# Patient Record
Sex: MALE | Race: WHITE | NOT HISPANIC OR LATINO | Employment: FULL TIME | URBAN - METROPOLITAN AREA
[De-identification: names, ages, dates, MRNs, and addresses within clinical notes are randomized per-mention and may not be internally consistent; named-entity substitution may affect disease eponyms.]

---

## 2017-03-23 ENCOUNTER — HOSPITAL ENCOUNTER (EMERGENCY)
Facility: HOSPITAL | Age: 27
Discharge: HOME | End: 2017-03-23
Attending: EMERGENCY MEDICINE
Payer: MEDICAID

## 2017-03-23 VITALS
TEMPERATURE: 100 F | DIASTOLIC BLOOD PRESSURE: 59 MMHG | RESPIRATION RATE: 14 BRPM | OXYGEN SATURATION: 100 % | SYSTOLIC BLOOD PRESSURE: 123 MMHG | HEART RATE: 95 BPM

## 2017-03-23 DIAGNOSIS — R05.9 COUGH: Primary | ICD-10-CM

## 2017-03-23 DIAGNOSIS — J06.9 UPPER RESPIRATORY TRACT INFECTION, UNSPECIFIED TYPE: ICD-10-CM

## 2017-03-23 DIAGNOSIS — R06.2 WHEEZING: ICD-10-CM

## 2017-03-23 LAB
DEPRECATED S PYO AG THROAT QL EIA: NEGATIVE
FLUAV AG SPEC QL IA: NEGATIVE
FLUBV AG SPEC QL IA: NEGATIVE
POCT GLUCOSE: 112 MG/DL (ref 70–110)
SPECIMEN SOURCE: NORMAL

## 2017-03-23 PROCEDURE — 25000242 PHARM REV CODE 250 ALT 637 W/ HCPCS: Performed by: PHYSICIAN ASSISTANT

## 2017-03-23 PROCEDURE — 82962 GLUCOSE BLOOD TEST: CPT

## 2017-03-23 PROCEDURE — 94640 AIRWAY INHALATION TREATMENT: CPT

## 2017-03-23 PROCEDURE — 63600175 PHARM REV CODE 636 W HCPCS: Performed by: PHYSICIAN ASSISTANT

## 2017-03-23 PROCEDURE — 86580 TB INTRADERMAL TEST: CPT | Performed by: PHYSICIAN ASSISTANT

## 2017-03-23 PROCEDURE — 99284 EMERGENCY DEPT VISIT MOD MDM: CPT | Mod: 25

## 2017-03-23 PROCEDURE — 87880 STREP A ASSAY W/OPTIC: CPT

## 2017-03-23 PROCEDURE — 87081 CULTURE SCREEN ONLY: CPT

## 2017-03-23 PROCEDURE — 99285 EMERGENCY DEPT VISIT HI MDM: CPT | Mod: ,,, | Performed by: PHYSICIAN ASSISTANT

## 2017-03-23 PROCEDURE — 25000003 PHARM REV CODE 250: Performed by: PHYSICIAN ASSISTANT

## 2017-03-23 PROCEDURE — 87400 INFLUENZA A/B EACH AG IA: CPT

## 2017-03-23 RX ORDER — PREDNISONE 20 MG/1
40 TABLET ORAL DAILY
Qty: 8 TABLET | Refills: 0 | Status: SHIPPED | OUTPATIENT
Start: 2017-03-23 | End: 2017-03-28

## 2017-03-23 RX ORDER — IPRATROPIUM BROMIDE AND ALBUTEROL SULFATE 2.5; .5 MG/3ML; MG/3ML
3 SOLUTION RESPIRATORY (INHALATION)
Status: COMPLETED | OUTPATIENT
Start: 2017-03-23 | End: 2017-03-23

## 2017-03-23 RX ORDER — PREDNISONE 20 MG/1
60 TABLET ORAL
Status: COMPLETED | OUTPATIENT
Start: 2017-03-23 | End: 2017-03-23

## 2017-03-23 RX ORDER — DOXYCYCLINE 100 MG/1
100 CAPSULE ORAL 2 TIMES DAILY
Qty: 14 CAPSULE | Refills: 0 | Status: SHIPPED | OUTPATIENT
Start: 2017-03-23 | End: 2017-03-30

## 2017-03-23 RX ORDER — BENZONATATE 100 MG/1
100 CAPSULE ORAL
Status: COMPLETED | OUTPATIENT
Start: 2017-03-23 | End: 2017-03-23

## 2017-03-23 RX ORDER — ALBUTEROL SULFATE 90 UG/1
1-2 AEROSOL, METERED RESPIRATORY (INHALATION) EVERY 6 HOURS PRN
Qty: 1 INHALER | Refills: 0 | Status: SHIPPED | OUTPATIENT
Start: 2017-03-23 | End: 2018-03-23

## 2017-03-23 RX ADMIN — IPRATROPIUM BROMIDE AND ALBUTEROL SULFATE 3 ML: .5; 3 SOLUTION RESPIRATORY (INHALATION) at 08:03

## 2017-03-23 RX ADMIN — PREDNISONE 60 MG: 20 TABLET ORAL at 08:03

## 2017-03-23 RX ADMIN — BENZONATATE 100 MG: 100 CAPSULE ORAL at 08:03

## 2017-03-23 RX ADMIN — Medication 5 UNITS: at 08:03

## 2017-03-23 NOTE — DISCHARGE INSTRUCTIONS
Follow up with your PCP, the ED or the health unit in 48-72 hours to have TB skin test read. Take the antibiotics (doxycycline) twice a day. Use inhaler as needed for shortness of breath and wheezing. Take steroids (prednisone) 2 tablets once a day starting TOMORROW. Return to the ED for any new or worsening symptoms, including those listed below.        Bronchitis with Wheezing (Viral or Bacterial: Adult)    Bronchitis is an infection of the air passages. It often occurs during a cold and is usually caused by a virus. Symptoms include cough with mucus (phlegm) and low-grade fever. This illness is contagious during the first few days and is spread through the air by coughing and sneezing, or by direct contact (touching the sick person and then touching your own eyes, nose, or mouth).  If there is a lot of inflammation, air flow is restricted. The air passages may also go into spasm, especially if you have asthma. This causes wheezing and difficulty breathing even in people who do not have asthma.  Bronchitis usually lasts 7 to 14 days. The wheezing should improve with treatment during the first week. An inhaler is often prescribed to relax the air passages and stop wheezing. Antibiotics will be prescribed if your doctor thinks there is also a secondary bacterial infection.  Home care  · If symptoms are severe, rest at home for the first 2 to 3 days. When you go back to your usual activities, don't let yourself get too tired.  · Do not smoke. Also avoid being exposed to secondhand smoke.  · You may use over-the-counter medicine to control fever or pain, unless another medicine was prescribed. Note: If you have chronic liver or kidney disease or have ever had a stomach ulcer or gastrointestinal bleeding, talk with your healthcare provider before using these medicines. Also talk to your provider if you are taking medicine to prevent blood clots.) Aspirin should never be given to anyone younger than 18 years of age who  is ill with a viral infection or fever. It may cause severe liver or brain damage.  · Your appetite may be poor, so a light diet is fine. Avoid dehydration by drinking 6 to 8 glasses of fluids per day (such as water, soft drinks, sports drinks, juices, tea, or soup). Extra fluids will help loosen secretions in the nose and lungs.  · Over-the-counter cough, cold, and sore-throat medicines will not shorten the length of the illness, but they may be helpful to reduce symptoms. (Note: Do not use decongestants if you have high blood pressure.)  · If you were given an inhaler, use it exactly as directed. If you need to use it more often than prescribed, your condition may be worsening. If this happens, contact your healthcare provider.  · If prescribed, finish all antibiotic medicine, even if you are feeling better after only a few days.  Follow-up care  Follow up with your healthcare provider, or as advised. If you had an X-ray or ECG (electrocardiogram), a specialist will review it. You will be notified of any new findings that may affect your care.  Note: If you are age 65 or older, or if you have a chronic lung disease or condition that affects your immune system, or you smoke, talk to your healthcare provider about having a pneumococcal vaccinations and a yearly influenza vaccination (flu shot).  When to seek medical advice  Call your healthcare provider right away if any of these occur:  · Fever of 100.4°F (38°C) or higher  · Coughing up increasing amounts of colored sputum  · Weakness, drowsiness, headache, facial pain, ear pain, or a stiff neck  Call 911, or get immediate medical care  Contact emergency services right away if any of these occur.  · Coughing up blood  · Worsening weakness, drowsiness, headache, or stiff neck  · Increased wheezing not helped with medication, shortness of breath, or pain with breathing  Date Last Reviewed: 9/13/2015  © 2102-4699 [employer]. [address] [facility],  [place] [address]. All rights reserved. This information is not intended as a substitute for professional medical care. Always follow your healthcare professional's instructions.          Viral Upper Respiratory Illness with Wheezing (Adult)  You have a viral upper respiratory illness (URI), which is another term for the common cold. When the infection causes a lot of irritation, the air passages can go into spasm. This causes wheezing and shortness of breath.    This illness is contagious during the first few days. It is spread through the air by coughing and sneezing. It may also be spread by direct contact (touching the sick person and then touching your own eyes, nose, or mouth). Frequent handwashing will decrease the risk.  Most viral illnesses go away within 7 to 10 days with rest and simple home remedies. Sometimes the illness may last for several weeks. Antibiotics will not kill a virus, and they are generally not prescribed for this condition.  Home care  · If symptoms are severe, rest at home for the first 2 to 3 days. When you resume activity, don't let yourself get too tired.  · Avoid being exposed to cigarette smoke (yours or others).  · You may use acetaminophen or ibuprofen to control pain and fever, unless another medicine was prescribed. (Note: If you have chronic liver or kidney disease, have ever had a stomach ulcer or gastrointestinal bleeding, or are taking blood-thinning medicines, talk with your healthcare provider before using these medicines.) Aspirin should never be given to anyone under 18 years of age who is ill with a viral infection or fever. It may cause severe liver or brain damage.  · Your appetite may be poor, so a light diet is fine. Avoid dehydration by drinking 6 to 8 glasses of fluids per day (water, soft drinks, juices, tea, or soup). Extra fluids will help loosen secretions in the nose and lungs.  · Over-the-counter cold medicines will not shorten the length of time youre  sick, but they may be helpful for the following symptoms: cough, sore throat, and nasal and sinus congestion. (Note: Do not use decongestants if you have high blood pressure.)  Follow-up care  Follow up with your healthcare provider, or as advised.  When to seek medical advice  Call your healthcare provider right away if any of these occur:  · Cough with lots of colored sputum (mucus)  · Severe headache; face, neck, or ear pain  · Difficulty swallowing due to throat pain  · Fever of 100.4ºF (38ºC) or higher, or as directed by your healthcare provider  Call 911, or get immediate medical care  Call emergency services right away if any of these occur:  · Chest pain, shortness of breath, worsening wheezing, or difficulty breathing  · Coughing up blood  · Inability to swallow due to throat pain  Date Last Reviewed: 9/13/2015  © 9169-9161 [employer]. [address] [place] [address], [place] [address]. All rights reserved. This information is not intended as a substitute for professional medical care. Always follow your healthcare professional's instructions.

## 2017-03-23 NOTE — ED TRIAGE NOTES
Pt states he was in longterm 2 months ago and the person in the next cell had active TB. He's been out of longterm for 2 and a half weeks. Pt coughing up pink-tinged sputum for a week and a half. Denies night sweats/fever. +chest pain when he coughs, +SOB on exertion.

## 2017-03-23 NOTE — ED PROVIDER NOTES
Encounter Date: 3/23/2017    SCRIBE #1 NOTE: I, Hilda Lares, am scribing for, and in the presence of,  Dr. Pereyra. I have scribed the following portions of the note - the APC attestation.       History     Chief Complaint   Patient presents with    Cough     states he just got out of detention after 3 months and was next to somebody with possible TB     Review of patient's allergies indicates:  No Known Allergies  HPI Comments: 26-year-old white male with past medical history of diabetes and hypertension presents to the ED complaining of a cough for the past 10 days.  He was incarcerated from January 11 to March 9 and while he was there was possibly exposed to someone with TB.  He is in a large common room into bunks and the person 2 bunks over from him was a male with possible TB.  This exposure happened at the end of January.  For the past 10 days he's been having a cough which he reports is mostly dry, rhinorrhea, chest pain and abdominal pain only with coughing, and dyspnea on exertion.  He does state that he washed a boat outside in the cold a few days before his symptoms began.  He denies any current antibiotic use or recent hospitalizations.  He denies fever, chills, nausea, vomiting, myalgias, headache, dizziness, lightheadedness.  He smokes 1/2-1 pack per day and denies alcohol or drug use.    The history is provided by the patient.     History reviewed. No pertinent past medical history.  Past Surgical History:   Procedure Laterality Date    KNEE SURGERY       History reviewed. No pertinent family history.  Social History   Substance Use Topics    Smoking status: Current Every Day Smoker     Types: Cigarettes    Smokeless tobacco: None    Alcohol use No     Review of Systems   Constitutional: Negative for chills and fever.   HENT: Positive for congestion and rhinorrhea. Negative for ear pain, postnasal drip and sore throat.    Eyes: Negative for photophobia and visual disturbance.   Respiratory:  Positive for cough and shortness of breath.         +PINEDO   Cardiovascular: Negative for chest pain, palpitations and leg swelling.   Gastrointestinal: Positive for abdominal pain (with cough). Negative for constipation, diarrhea, nausea and vomiting.   Genitourinary: Negative for dysuria and hematuria.   Musculoskeletal: Negative for myalgias, neck pain and neck stiffness.   Skin: Negative for rash and wound.   Neurological: Negative for dizziness, syncope, weakness, light-headedness, numbness and headaches.   Psychiatric/Behavioral: Negative for confusion.       Physical Exam   Initial Vitals   BP Pulse Resp Temp SpO2   03/23/17 0732 03/23/17 0732 03/23/17 0732 03/23/17 0732 03/23/17 0732   139/83 99 20 99.5 °F (37.5 °C) 90 %     Physical Exam    Vitals reviewed.  Constitutional: He appears well-developed and well-nourished. He is not diaphoretic. No distress.   HENT:   Head: Normocephalic and atraumatic.   Right Ear: Tympanic membrane, external ear and ear canal normal.   Left Ear: Tympanic membrane, external ear and ear canal normal.   Mouth/Throat: Uvula is midline and mucous membranes are normal. Posterior oropharyngeal edema and posterior oropharyngeal erythema present. No oropharyngeal exudate or tonsillar abscesses.   Neck: Normal range of motion. Neck supple.   Cardiovascular: Normal rate, regular rhythm and normal heart sounds. Exam reveals no gallop and no friction rub.    No murmur heard.  No lower extremity edema.   Pulmonary/Chest: He has wheezes. He has rhonchi. He has no rales.   Diffuse inspiratory and expiratory wheezes with diffuse expiratory rhonchi.   Abdominal: Soft. Bowel sounds are normal. There is no tenderness. There is no rebound and no guarding.   Musculoskeletal: Normal range of motion.   Neurological: He is alert and oriented to person, place, and time.   Skin: Skin is warm and dry. No rash noted. No erythema.   Psychiatric: He has a normal mood and affect.         ED Course    Procedures  Labs Reviewed   POCT GLUCOSE - Abnormal; Notable for the following:        Result Value    POCT Glucose 112 (*)     All other components within normal limits   THROAT SCREEN, RAPID   CULTURE, STREP A,  THROAT   INFLUENZA A AND B ANTIGEN        Imaging Results         X-Ray Chest PA And Lateral (Final result) Result time:  03/23/17 09:30:12    Final result by Joseph Busch MD (03/23/17 09:30:12)    Impression:     No significant intrathoracic abnormality.      Electronically signed by: Joseph Busch MD  Date:     03/23/17  Time:    09:30     Narrative:    2 views of the chest were obtained, with PA and lateral projections submitted.  No prior chest radiographs currently available.    Heart size is normal, as is the appearance of the pulmonary vascularity.  Lung zones are clear, and free of airspace consolidation or volume loss.  No pleural fluid.  No hilar or mediastinal adenopathy.              X-Rays:   Independently Interpreted Readings:   Chest X-Ray: Normal heart size.  No infiltrates.  No acute abnormalities.     Medical Decision Making:   History:   Old Medical Records: I decided to obtain old medical records.  Clinical Tests:   Lab Tests: Ordered and Reviewed  Radiological Study: Reviewed and Ordered       APC / Resident Notes:   26-year-old white male with past medical history of diabetes and hypertension presents to the ED complaining of a cough for the past 10 days.  Pulse ox 90% upon arrival to ED.  Patient placed on oxygen via nasal cannula.  Mask applied due to possible TB exposure.  Patient in no acute distress.  Regular rate and rhythm without murmurs.  Diffuse inspiratory and expiratory wheezes noted with expiratory rhonchi.  Posterior oropharyngeal erythema and edema without exudates.  No signs of acute otitis media.  Abdomen soft and nontender to palpation.  No lower extremity edema noted.  Differential diagnosis includes but isn't limited to viral URI, pneumonia, bronchitis, strep  pharyngitis, TB.  Will get chest x-ray, rapid strep and flu swab, place PPD, give prednisone, DuoNeb, and Tessalon and reassess.    Glucose 112.  Rapid strep negative.  Influenza negative.    CXR with no significant intrathoracic abnormality.    PPD placed in the ED. He reports improvement with DuoNebs, tessalon, and prednisone. I do not feel that he needs any further labs or imaging at this time. Stable for discharge.    He was discharged with prescriptions for albuterol inhaler, prednisone, and doxycycline.  He will follow up with his PCP.  He will need his PPD read in 48-72 hours.  All of the patient's questions were answered.  I reviewed the patient's chart, labs, and imaging and discussed the case with my supervising physician.          Scribe Attestation:   Scribe #1: I performed the above scribed service and the documentation accurately describes the services I performed. I attest to the accuracy of the note.    Attending Attestation:     Physician Attestation Statement for NP/PA:   I discussed this assessment and plan of this patient with the NP/PA, but I did not personally examine the patient. The face to face encounter was performed by the NP/PA.    Other NP/PA Attestation Additions:    History of Present Illness: This is an emergent evaluation of a 26 y.o. male presenting with  Pt is concerned about possible TB exposure while he was in a community custodial 3 weeks ago. Very low suspicion for an acute illness causing respiratory symptoms, likely viral. Significant improvement with bronchodilator. Will discharge with MDI, steroids, instructions to monitor sugars closely. PPD placed.          Physician Attestation for Scribe:  Physician Attestation Statement for Scribe #1: I, Dr. Pereyra, reviewed documentation, as scribed by Hilda Lares in my presence, and it is both accurate and complete.                 ED Course     Clinical Impression:   The primary encounter diagnosis was Cough. Diagnoses of Upper  respiratory tract infection, unspecified type and Wheezing were also pertinent to this visit.    Disposition:   Disposition: Discharged  Condition: Stable       Casandra Samaniego PA-C  03/23/17 154       Kahlil Pereyra MD  03/23/17 [id number]

## 2017-03-23 NOTE — ED AVS SNAPSHOT
[facility]  [address]  [facility] [address]               Nagi Castaneda   3/23/2017  7:42 AM   ED    Description:  Male : 1990   Department:  [facility] [address]           Your Care was Coordinated By:     Provider Role From To    Kahlil Pereyra MD Attending Provider 17 0738 --    Casandra Samaniego PA-C Physician Assistant 17 0747 --      Reason for Visit     Cough           Diagnoses this Visit        Comments    Cough    -  Primary     Upper respiratory tract infection, unspecified type         Wheezing           ED Disposition     ED Disposition Condition Comment    Discharge             To Do List           Follow-up Information     Schedule an appointment as soon as possible for a visit with Bret Latham - Internal Medicine.    Specialty:  Internal Medicine    Contact information:    [address]  [facility] [id number] [phone]    Additional information:    [facility]       These Medications        Disp Refills Start End    albuterol 90 mcg/actuation inhaler 1 Inhaler 0 3/23/2017 3/23/2018    Inhale 1-2 puffs into the lungs every 6 (six) hours as needed for Wheezing or Shortness of Breath. Rescue - Inhalation    predniSONE (DELTASONE) 20 MG tablet 8 tablet 0 3/23/2017 3/28/2017    Take 2 tablets (40 mg total) by mouth once daily. - Oral    doxycycline (VIBRAMYCIN) 100 MG Cap 14 capsule 0 3/23/2017 3/30/2017    Take 1 capsule (100 mg total) by mouth 2 (two) times daily. - Oral      [facility]     [facility] -  Assistance  Registered nurses in the [facility] provide clinical advisement, health education, appointment booking, and other advisory services.  Call for this free service at [phone].             Medications           Message regarding Medications     Verify the changes and/or additions to your medication regime  listed below are the same as discussed with your clinician today.  If any of these changes or additions are incorrect, please notify your healthcare provider.        START taking these NEW medications        Refills    albuterol 90 mcg/actuation inhaler 0    Sig: Inhale 1-2 puffs into the lungs every 6 (six) hours as needed for Wheezing or Shortness of Breath. Rescue    Class: Print    Route: Inhalation    predniSONE (DELTASONE) 20 MG tablet 0    Sig: Take 2 tablets (40 mg total) by mouth once daily.    Class: Print    Route: Oral    doxycycline (VIBRAMYCIN) 100 MG Cap 0    Sig: Take 1 capsule (100 mg total) by mouth 2 (two) times daily.    Class: Print    Route: Oral      These medications were administered today        Dose Freq    albuterol-ipratropium 2.5mg-0.5mg/3mL nebulizer solution 3 mL 3 mL Every 5 min    Sig: Take 3 mLs by nebulization every 5 (five) minutes.    Class: Normal    Route: Nebulization    Cosign for Ordering: Accepted by Kahlil Pereyra MD on 3/23/2017  8:07 AM    benzonatate capsule 100 mg 100 mg ED 1 Time    Sig: Take 1 capsule (100 mg total) by mouth ED 1 Time.    Class: Normal    Route: Oral    Cosign for Ordering: Accepted by Kahlil Pereyra MD on 3/23/2017  8:07 AM    predniSONE tablet 60 mg 60 mg ED 1 Time    Sig: Take 3 tablets (60 mg total) by mouth ED 1 Time.    Class: Normal    Route: Oral    Cosign for Ordering: Accepted by Kahlil Pereyra MD on 3/23/2017  8:43 AM    tuberculin injection 5 Units 5 Units Once    Sig: Inject 0.1 mLs (5 Units total) into the skin once.    Class: Normal    Route: Intradermal    Cosign for Ordering: Accepted by Kahlil Pereyra MD on 3/23/2017  9:21 AM           Verify that the below list of medications is an accurate representation of the medications you are currently taking.  If none reported, the list may be blank. If incorrect, please contact your healthcare provider. Carry this list with you in case of emergency.           Current  Medications     albuterol 90 mcg/actuation inhaler Inhale 1-2 puffs into the lungs every 6 (six) hours as needed for Wheezing or Shortness of Breath. Rescue    doxycycline (VIBRAMYCIN) 100 MG Cap Take 1 capsule (100 mg total) by mouth 2 (two) times daily.    predniSONE (DELTASONE) 20 MG tablet Take 2 tablets (40 mg total) by mouth once daily.           Clinical Reference Information           Your Vitals Were     BP Pulse Temp Resp SpO2       123/59 95 99.5 °F (37.5 °C) (Oral) 14 100%       Allergies as of 3/23/2017     No Known Allergies      Immunizations Administered on Date of Encounter - 3/23/2017     Name Date Dose VIS Date Route    PPD Test 3/23/2017 5 Units N/A Intradermal      ED Micro, Lab, POCT     Start Ordered       Status Ordering Provider    03/23/17 0855 03/23/17 0855  POCT glucose  Once      Final result     03/23/17 0845 03/23/17 0844    Once,   Status:  Canceled      Canceled     03/23/17 0844 03/23/17 0843  POCT glucose  Once      Acknowledged     03/23/17 0759 03/23/17 0758  Influenza antigen  STAT      Final result     03/23/17 0759 03/23/17 0758  Rapid strep screen  STAT      Final result     03/23/17 0758 03/23/17 0758  Strep A culture, throat  Once      In process       ED Imaging Orders     Start Ordered       Status Ordering Provider    03/23/17 0759 03/23/17 0758  X-Ray Chest PA And Lateral  1 time imaging      Final result         Discharge Instructions       Follow up with your PCP, the ED or the health unit in 48-72 hours to have TB skin test read. Take the antibiotics (doxycycline) twice a day. Use inhaler as needed for shortness of breath and wheezing. Take steroids (prednisone) 2 tablets once a day starting TOMORROW. Return to the ED for any new or worsening symptoms, including those listed below.        Bronchitis with Wheezing (Viral or Bacterial: Adult)    Bronchitis is an infection of the air passages. It often occurs during a cold and is usually caused by a virus. Symptoms  include cough with mucus (phlegm) and low-grade fever. This illness is contagious during the first few days and is spread through the air by coughing and sneezing, or by direct contact (touching the sick person and then touching your own eyes, nose, or mouth).  If there is a lot of inflammation, air flow is restricted. The air passages may also go into spasm, especially if you have asthma. This causes wheezing and difficulty breathing even in people who do not have asthma.  Bronchitis usually lasts 7 to 14 days. The wheezing should improve with treatment during the first week. An inhaler is often prescribed to relax the air passages and stop wheezing. Antibiotics will be prescribed if your doctor thinks there is also a secondary bacterial infection.  Home care  · If symptoms are severe, rest at home for the first 2 to 3 days. When you go back to your usual activities, don't let yourself get too tired.  · Do not smoke. Also avoid being exposed to secondhand smoke.  · You may use over-the-counter medicine to control fever or pain, unless another medicine was prescribed. Note: If you have chronic liver or kidney disease or have ever had a stomach ulcer or gastrointestinal bleeding, talk with your healthcare provider before using these medicines. Also talk to your provider if you are taking medicine to prevent blood clots.) Aspirin should never be given to anyone younger than 18 years of age who is ill with a viral infection or fever. It may cause severe liver or brain damage.  · Your appetite may be poor, so a light diet is fine. Avoid dehydration by drinking 6 to 8 glasses of fluids per day (such as water, soft drinks, sports drinks, juices, tea, or soup). Extra fluids will help loosen secretions in the nose and lungs.  · Over-the-counter cough, cold, and sore-throat medicines will not shorten the length of the illness, but they may be helpful to reduce symptoms. (Note: Do not use decongestants if you have high blood  pressure.)  · If you were given an inhaler, use it exactly as directed. If you need to use it more often than prescribed, your condition may be worsening. If this happens, contact your healthcare provider.  · If prescribed, finish all antibiotic medicine, even if you are feeling better after only a few days.  Follow-up care  Follow up with your healthcare provider, or as advised. If you had an X-ray or ECG (electrocardiogram), a specialist will review it. You will be notified of any new findings that may affect your care.  Note: If you are age 65 or older, or if you have a chronic lung disease or condition that affects your immune system, or you smoke, talk to your healthcare provider about having a pneumococcal vaccinations and a yearly influenza vaccination (flu shot).  When to seek medical advice  Call your healthcare provider right away if any of these occur:  · Fever of 100.4°F (38°C) or higher  · Coughing up increasing amounts of colored sputum  · Weakness, drowsiness, headache, facial pain, ear pain, or a stiff neck  Call 911, or get immediate medical care  Contact emergency services right away if any of these occur.  · Coughing up blood  · Worsening weakness, drowsiness, headache, or stiff neck  · Increased wheezing not helped with medication, shortness of breath, or pain with breathing  Date Last Reviewed: 9/13/2015 © 2000-2016 [employer]. [address] [place] [address]. All rights reserved. This information is not intended as a substitute for professional medical care. Always follow your healthcare professional's instructions.          Viral Upper Respiratory Illness with Wheezing (Adult)  You have a viral upper respiratory illness (URI), which is another term for the common cold. When the infection causes a lot of irritation, the air passages can go into spasm. This causes wheezing and shortness of breath.    This illness is contagious during the first few days. It is spread  through the air by coughing and sneezing. It may also be spread by direct contact (touching the sick person and then touching your own eyes, nose, or mouth). Frequent handwashing will decrease the risk.  Most viral illnesses go away within 7 to 10 days with rest and simple home remedies. Sometimes the illness may last for several weeks. Antibiotics will not kill a virus, and they are generally not prescribed for this condition.  Home care  · If symptoms are severe, rest at home for the first 2 to 3 days. When you resume activity, don't let yourself get too tired.  · Avoid being exposed to cigarette smoke (yours or others).  · You may use acetaminophen or ibuprofen to control pain and fever, unless another medicine was prescribed. (Note: If you have chronic liver or kidney disease, have ever had a stomach ulcer or gastrointestinal bleeding, or are taking blood-thinning medicines, talk with your healthcare provider before using these medicines.) Aspirin should never be given to anyone under 18 years of age who is ill with a viral infection or fever. It may cause severe liver or brain damage.  · Your appetite may be poor, so a light diet is fine. Avoid dehydration by drinking 6 to 8 glasses of fluids per day (water, soft drinks, juices, tea, or soup). Extra fluids will help loosen secretions in the nose and lungs.  · Over-the-counter cold medicines will not shorten the length of time youre sick, but they may be helpful for the following symptoms: cough, sore throat, and nasal and sinus congestion. (Note: Do not use decongestants if you have high blood pressure.)  Follow-up care  Follow up with your healthcare provider, or as advised.  When to seek medical advice  Call your healthcare provider right away if any of these occur:  · Cough with lots of colored sputum (mucus)  · Severe headache; face, neck, or ear pain  · Difficulty swallowing due to throat pain  · Fever of 100.4ºF (38ºC) or higher, or as directed by your  healthcare provider  Call 911, or get immediate medical care  Call emergency services right away if any of these occur:  · Chest pain, shortness of breath, worsening wheezing, or difficulty breathing  · Coughing up blood  · Inability to swallow due to throat pain  Date Last Reviewed: 9/13/2015  © 8317-6342 [employer]. [address] [place] [address]. All rights reserved. This information is not intended as a substitute for professional medical care. Always follow your healthcare professional's instructions.          [facility] Sign-Up     Activating your [facility] account is as easy as 1-2-3!     1) Visit [email or web address], select Sign Up Now, enter this activation code and your date of birth, then select Next.  [email or web address]  Expires: 5/7/2017 10:00 AM      2) Create a username and password to use when you visit [facility] in the future and select a security question in case you lose your password and select Next.    3) Enter your e-mail address and click Sign Up!    Additional Information  If you have questions, please e-mail [email or web address] or call [phone] to talk to our [facility] staff. Remember, [facility] is NOT to be used for urgent needs. For medical emergencies, dial 911.          [facility] complies with applicable Federal civil rights laws and does not discriminate on the basis of race, color, national origin, age, disability, or sex.        Language Assistance Services     ATTENTION: Language assistance services are available, free of charge. Please call [phone].      ATENCIÓN: Si habla español, tiene a martinez disposición servicios gratuitos de asistencia lingüística. Llame al [phone].     CHÚ Ý: N?u b?n nói Ti?ng Vi?t, có các d?ch v? h? tr? ngôn ng? mi?n phí dành cho b?n. G?i s? [phone].

## 2017-03-25 LAB — BACTERIA THROAT CULT: NORMAL

## 2017-09-25 ENCOUNTER — HOSPITAL ENCOUNTER (EMERGENCY)
Facility: HOSPITAL | Age: 27
Discharge: HOME | End: 2017-09-26
Attending: EMERGENCY MEDICINE
Payer: MEDICAID

## 2017-09-25 DIAGNOSIS — R45.851 SUICIDAL IDEATION: Primary | ICD-10-CM

## 2017-09-25 DIAGNOSIS — F19.10 POLYSUBSTANCE ABUSE: ICD-10-CM

## 2017-09-25 PROBLEM — F11.24 OPIOID DEPENDENCE WITH OPIOID-INDUCED MOOD DISORDER: Status: ACTIVE | Noted: 2017-09-25

## 2017-09-25 LAB
ALBUMIN SERPL BCP-MCNC: 4.3 G/DL
ALP SERPL-CCNC: 84 U/L
ALT SERPL W/O P-5'-P-CCNC: 29 U/L
AMPHET+METHAMPHET UR QL: NEGATIVE
ANION GAP SERPL CALC-SCNC: 11 MMOL/L
APAP SERPL-MCNC: <3 UG/ML
AST SERPL-CCNC: 25 U/L
BACTERIA #/AREA URNS AUTO: ABNORMAL /HPF
BARBITURATES UR QL SCN>200 NG/ML: NEGATIVE
BASOPHILS # BLD AUTO: 0.02 K/UL
BASOPHILS NFR BLD: 0.2 %
BENZODIAZ UR QL SCN>200 NG/ML: NEGATIVE
BILIRUB SERPL-MCNC: 0.7 MG/DL
BILIRUB UR QL STRIP: NEGATIVE
BUN SERPL-MCNC: 9 MG/DL
BZE UR QL SCN: ABNORMAL
CALCIUM SERPL-MCNC: 9.7 MG/DL
CANNABINOIDS UR QL SCN: ABNORMAL
CHLORIDE SERPL-SCNC: 102 MMOL/L
CLARITY UR REFRACT.AUTO: ABNORMAL
CO2 SERPL-SCNC: 23 MMOL/L
COLOR UR AUTO: ABNORMAL
CREAT SERPL-MCNC: 1.1 MG/DL
CREAT UR-MCNC: >450 MG/DL
DIFFERENTIAL METHOD: NORMAL
EOSINOPHIL # BLD AUTO: 0.1 K/UL
EOSINOPHIL NFR BLD: 0.9 %
ERYTHROCYTE [DISTWIDTH] IN BLOOD BY AUTOMATED COUNT: 13 %
EST. GFR  (AFRICAN AMERICAN): >60 ML/MIN/1.73 M^2
EST. GFR  (NON AFRICAN AMERICAN): >60 ML/MIN/1.73 M^2
ETHANOL SERPL-MCNC: <10 MG/DL
GLUCOSE SERPL-MCNC: 119 MG/DL
GLUCOSE UR QL STRIP: NEGATIVE
HCT VFR BLD AUTO: 44.4 %
HGB BLD-MCNC: 15.3 G/DL
HGB UR QL STRIP: NEGATIVE
HYALINE CASTS UR QL AUTO: 44 /LPF
KETONES UR QL STRIP: NEGATIVE
LEUKOCYTE ESTERASE UR QL STRIP: NEGATIVE
LYMPHOCYTES # BLD AUTO: 2.3 K/UL
LYMPHOCYTES NFR BLD: 22.8 %
MCH RBC QN AUTO: 30.5 PG
MCHC RBC AUTO-ENTMCNC: 34.5 G/DL
MCV RBC AUTO: 88 FL
METHADONE UR QL SCN>300 NG/ML: NEGATIVE
MICROSCOPIC COMMENT: ABNORMAL
MONOCYTES # BLD AUTO: 0.8 K/UL
MONOCYTES NFR BLD: 7.4 %
NEUTROPHILS # BLD AUTO: 7.1 K/UL
NEUTROPHILS NFR BLD: 68.6 %
NITRITE UR QL STRIP: NEGATIVE
OPIATES UR QL SCN: ABNORMAL
PCP UR QL SCN>25 NG/ML: NEGATIVE
PH UR STRIP: 5 [PH] (ref 5–8)
PLATELET # BLD AUTO: 316 K/UL
PMV BLD AUTO: 9.8 FL
POTASSIUM SERPL-SCNC: 3.4 MMOL/L
PROT SERPL-MCNC: 8.4 G/DL
PROT UR QL STRIP: ABNORMAL
RBC # BLD AUTO: 5.02 M/UL
RBC #/AREA URNS AUTO: 1 /HPF (ref 0–4)
SODIUM SERPL-SCNC: 136 MMOL/L
SP GR UR STRIP: 1.03 (ref 1–1.03)
TOXICOLOGY INFORMATION: ABNORMAL
TSH SERPL DL<=0.005 MIU/L-ACNC: 1.07 UIU/ML
URN SPEC COLLECT METH UR: ABNORMAL
UROBILINOGEN UR STRIP-ACNC: 2 EU/DL
WBC # BLD AUTO: 10.27 K/UL
WBC #/AREA URNS AUTO: 4 /HPF (ref 0–5)

## 2017-09-25 PROCEDURE — 99285 EMERGENCY DEPT VISIT HI MDM: CPT | Mod: 25

## 2017-09-25 PROCEDURE — 81001 URINALYSIS AUTO W/SCOPE: CPT

## 2017-09-25 PROCEDURE — 85025 COMPLETE CBC W/AUTO DIFF WBC: CPT

## 2017-09-25 PROCEDURE — 80320 DRUG SCREEN QUANTALCOHOLS: CPT

## 2017-09-25 PROCEDURE — 80329 ANALGESICS NON-OPIOID 1 OR 2: CPT

## 2017-09-25 PROCEDURE — 80053 COMPREHEN METABOLIC PANEL: CPT

## 2017-09-25 PROCEDURE — 84443 ASSAY THYROID STIM HORMONE: CPT

## 2017-09-25 PROCEDURE — 25000003 PHARM REV CODE 250: Performed by: STUDENT IN AN ORGANIZED HEALTH CARE EDUCATION/TRAINING PROGRAM

## 2017-09-25 PROCEDURE — 93010 ELECTROCARDIOGRAM REPORT: CPT | Mod: ,,, | Performed by: INTERNAL MEDICINE

## 2017-09-25 PROCEDURE — 80307 DRUG TEST PRSMV CHEM ANLYZR: CPT

## 2017-09-25 PROCEDURE — 99285 EMERGENCY DEPT VISIT HI MDM: CPT | Mod: ,,, | Performed by: EMERGENCY MEDICINE

## 2017-09-25 PROCEDURE — 93005 ELECTROCARDIOGRAM TRACING: CPT

## 2017-09-25 PROCEDURE — 96372 THER/PROPH/DIAG INJ SC/IM: CPT

## 2017-09-25 PROCEDURE — 63600175 PHARM REV CODE 636 W HCPCS: Performed by: STUDENT IN AN ORGANIZED HEALTH CARE EDUCATION/TRAINING PROGRAM

## 2017-09-25 RX ORDER — LORAZEPAM 2 MG/ML
1 INJECTION INTRAMUSCULAR
Status: COMPLETED | OUTPATIENT
Start: 2017-09-25 | End: 2017-09-25

## 2017-09-25 RX ORDER — HALOPERIDOL 5 MG/ML
INJECTION INTRAMUSCULAR
Status: DISCONTINUED
Start: 2017-09-25 | End: 2017-09-25 | Stop reason: SDUPTHER

## 2017-09-25 RX ORDER — ONDANSETRON 4 MG/1
4 TABLET, ORALLY DISINTEGRATING ORAL
Status: COMPLETED | OUTPATIENT
Start: 2017-09-25 | End: 2017-09-25

## 2017-09-25 RX ORDER — LORAZEPAM 2 MG/ML
INJECTION INTRAMUSCULAR
Status: DISPENSED
Start: 2017-09-25 | End: 2017-09-26

## 2017-09-25 RX ORDER — HALOPERIDOL 5 MG/ML
5 INJECTION INTRAMUSCULAR
Status: COMPLETED | OUTPATIENT
Start: 2017-09-25 | End: 2017-09-25

## 2017-09-25 RX ORDER — DIPHENHYDRAMINE HYDROCHLORIDE 50 MG/ML
INJECTION INTRAMUSCULAR; INTRAVENOUS
Status: DISCONTINUED
Start: 2017-09-25 | End: 2017-09-25 | Stop reason: SDUPTHER

## 2017-09-25 RX ORDER — DIPHENHYDRAMINE HYDROCHLORIDE 50 MG/ML
25 INJECTION INTRAMUSCULAR; INTRAVENOUS
Status: COMPLETED | OUTPATIENT
Start: 2017-09-25 | End: 2017-09-25

## 2017-09-25 RX ORDER — IBUPROFEN 600 MG/1
600 TABLET ORAL
Status: COMPLETED | OUTPATIENT
Start: 2017-09-25 | End: 2017-09-25

## 2017-09-25 RX ADMIN — ONDANSETRON 4 MG: 4 TABLET, ORALLY DISINTEGRATING ORAL at 07:09

## 2017-09-25 RX ADMIN — IBUPROFEN 600 MG: 600 TABLET, FILM COATED ORAL at 07:09

## 2017-09-25 RX ADMIN — LORAZEPAM 1 MG: 2 INJECTION INTRAMUSCULAR; INTRAVENOUS at 08:09

## 2017-09-25 RX ADMIN — HALOPERIDOL 5 MG: 5 INJECTION INTRAMUSCULAR at 08:09

## 2017-09-25 RX ADMIN — DIPHENHYDRAMINE HYDROCHLORIDE 25 MG: 50 INJECTION, SOLUTION INTRAMUSCULAR; INTRAVENOUS at 08:09

## 2017-09-25 NOTE — ED NOTES
Security in waiting room and charge nurse aware code watch in waiting room , very calm with mother

## 2017-09-25 NOTE — ED NOTES
APPEARANCE: awake and alert in NAD.  SKIN: warm, dry and intact. No breakdown or bruising.  MUSCULOSKELETAL: Patient moving all extremities spontaneously, no obvious swelling or deformities noted. Ambulates independently.  RESPIRATORY: no shortness of breath. All breath sounds CTA bilaterally.  CARDIAC: heart tones normal. Regular rate and rhythm; 2+ distal pulses; no peripheral edema  ABDOMEN: S/ND/NT, normoactive bowel sounds present in all four quadrants. Normal stool pattern.  : voids spontaneously without difficulty.  Neurologic: AAO x 4; follows commands equal strength in all extremities; denies numbness/tingling.

## 2017-09-25 NOTE — ED TRIAGE NOTES
Pt reports he has a drug addiction to heroin. Pt reports he last used Friday. Pt reports when he is off the drugs he has thoughts of harming himself. Pt reports he is really depressed. Pt reports he thought about shooting himself.  Pt reports he doesn't own a weapon.

## 2017-09-26 VITALS
SYSTOLIC BLOOD PRESSURE: 106 MMHG | RESPIRATION RATE: 16 BRPM | BODY MASS INDEX: 25.6 KG/M2 | WEIGHT: 189 LBS | TEMPERATURE: 99 F | HEIGHT: 72 IN | HEART RATE: 91 BPM | OXYGEN SATURATION: 98 % | DIASTOLIC BLOOD PRESSURE: 61 MMHG

## 2017-09-26 PROCEDURE — 99285 EMERGENCY DEPT VISIT HI MDM: CPT | Mod: SA,HB,S$PBB, | Performed by: NURSE PRACTITIONER

## 2017-09-26 NOTE — ED NOTES
PEC received in [facility] at 2104. Will begin seeking inpatient psychiatric placement once medically cleared.

## 2017-09-26 NOTE — ED NOTES
Patient calm when aroused from sleep.  Patient quickly ate breakfast and return to resting in bed.

## 2017-09-26 NOTE — ED NOTES
Calm, cooperative since arrival to [facility]. Patient lying in bed resting quietly at this time.
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
## 2017-09-26 NOTE — ED NOTES
Remains resting in bed quietly, eyes closed. No behavioral disturbances since arrival to [facility].

## 2017-09-26 NOTE — SUBJECTIVE & OBJECTIVE
Patient History           Medical as of 9/26/2017    Past Medical History: Patient provided no pertinent medical history.   Pertinent Negatives     Diagnosis Date Noted Comments Source    Anticoagulant long-term use 1/25/2013 -- Provider    Diabetes mellitus 1/25/2013 -- Provider    Hypertension 1/25/2013 -- Provider                  Surgical as of 9/26/2017     Past Surgical History     Procedure Laterality Date Comments Source    KNEE SURGERY -- -- -- Provider                  Family as of 9/26/2017    **None**           Tobacco Use as of 9/26/2017     Smoking Status Smoking Start Date Smoking Quit Date Packs/day Years Used    Current Every Day Smoker -- -- -- --    Types Comments Smokeless Tobacco Status Smokeless Tobacco Quit Date Source     Cigarettes -- Never Used -- Provider            Alcohol Use as of 9/26/2017     Alcohol Use Drinks/Week Alcohol/Week Comments Source    No -- -- -- Provider            Drug Use as of 9/26/2017     Drug Use Types Frequency Comments Source    Yes  Marijuana, Cocaine 1.0 heroin Provider            Sexual Activity as of 9/26/2017     Sexually Active Birth Control Partners Comments Source    -- -- -- -- Provider            Activities of Daily Living as of 9/26/2017    **None**           Social Documentation as of 9/26/2017    **None**           Occupational as of 9/26/2017    **None**           Socioeconomic as of 9/26/2017     Marital Status Spouse Name Number of Children Years Education Preferred Language Ethnicity Race Source    Single -- -- -- English /White White --         Pertinent History Q A Comments    as of 9/26/2017 Lives with      Place in Birth Order      Lives in      Number of Siblings      Raised by      Legal Involvement      Childhood Trauma      Criminal History of      Financial Status      Highest Level of Education      Does patient have access to a firearm?       Service      Primary Leisure Activity      Spirituality         Review  of patient's allergies indicates:  No Known Allergies    No current facility-administered medications on file prior to encounter.      Current Outpatient Prescriptions on File Prior to Encounter   Medication Sig    albuterol 90 mcg/actuation inhaler Inhale 1-2 puffs into the lungs every 6 (six) hours as needed for Wheezing or Shortness of Breath. Rescue     Psychotherapeutics     Start     Stop Route Frequency Ordered    09/25/17 2021  lorazepam (ATIVAN) 2 mg/mL injection     Comments:  Created by cabinet override    09/26 0829 09/25/17 2021        Review of Systems   Psychiatric/Behavioral: Negative.      Objective:     Vital Signs (Most Recent):  Temp: 98.7 °F (37.1 °C) (09/26/17 1031)  Pulse: 91 (09/26/17 1031)  Resp: 16 (09/26/17 1031)  BP: 106/61 (09/26/17 1031)  SpO2: 98 % (09/25/17 2310) Vital Signs (24h Range):  Temp:  [97.6 °F (36.4 °C)-98.7 °F (37.1 °C)] 98.7 °F (37.1 °C)  Pulse:  [76-91] 91  Resp:  [16-18] 16  SpO2:  [98 %-99 %] 98 %  BP: (106-126)/(61-69) 106/61     Height: 6' (182.9 cm)  Weight: 85.7 kg (189 lb)  Body mass index is 25.63 kg/m².    No intake or output data in the 24 hours ending 09/26/17 1234    Physical Exam   Psychiatric: He has a normal mood and affect. His speech is normal and behavior is normal. Judgment and thought content normal. Cognition and memory are normal.       NEUROLOGICAL EXAMINATION:     MENTAL STATUS   Speech: speech is normal       Significant Labs:   Last 24 Hours:   Recent Lab Results       09/25/17 1851 09/25/17 1850      Benzodiazepines Negative      Methadone metabolites Negative      Phencyclidine Negative      Acetaminophen (Tylenol), Serum  <3.0  Comment:  Toxic Levels:  Adults (4 hr post-ingestion).........>150 ug/mL  Adults (12 hr post-ingestion)........>40 ug/mL  Peds (2 hr post-ingestion, liquid)...>225 ug/mL  (L)     Albumin  4.3     Alcohol, Medical, Serum  <10     Alkaline Phosphatase  84     ALT  29     Amphetamine Screen, Ur Negative      Anion  Gap  11     Appearance, UA  Hazy(A)     AST  25     Bacteria, UA  Moderate(A)     Barbiturate Screen, Ur Negative      Baso #  0.02     Basophil%  0.2     Bilirubin (UA)  Negative     Total Bilirubin  0.7  Comment:  For infants and newborns, interpretation of results should be based  on gestational age, weight and in agreement with clinical  observations.  Premature Infant recommended reference ranges:  Up to 24 hours.............<8.0 mg/dL  Up to 48 hours............<12.0 mg/dL  3-5 days..................<15.0 mg/dL  6-29 days.................<15.0 mg/dL       BUN, Bld  9     Calcium  9.7     Chloride  102     CO2  23     Cocaine (Metab.) Presumptive Positive      Color, UA  Bee     Creatinine  1.1     Creatinine, Random Ur >450.0  Comment:  The random urine reference ranges provided were established   for 24 hour urine collections.  No reference ranges exist for  random urine specimens.  Correlate clinically.  (H)      Differential Method  Automated     eGFR if African American  >60.0     eGFR if non   >60.0  Comment:  Calculation used to obtain the estimated glomerular filtration  rate (eGFR) is the CKD-EPI equation. Since race is unknown   in our information system, the eGFR values for   -American and Non--American patients are given   for each creatinine result.       Eos #  0.1     Eosinophil%  0.9     Glucose  119(H)     Glucose, UA  Negative     Gran #  7.1     Gran%  68.6     Hematocrit  44.4     Hemoglobin  15.3     Hyaline Casts, UA  44(A)     Ketones, UA  Negative     Leukocytes, UA  Negative     Lymph #  2.3     Lymph%  22.8     MCH  30.5     MCHC  34.5     MCV  88     Microscopic Comment  SEE COMMENT  Comment:  Other formed elements not mentioned in the report are not   present in the microscopic examination.        Mono #  0.8     Mono%  7.4     MPV  9.8     Nitrite, UA  Negative     Occult Blood UA  Negative     Opiate Scrn, Ur Presumptive Positive      pH, UA  5.0      Platelets  316     Potassium  3.4(L)     Total Protein  8.4     Protein, UA  2+  Comment:  Recommend a 24 hour urine protein or a urine   protein/creatinine ratio if globulin induced proteinuria is  clinically suspected.  (A)     RBC  5.02     RBC, UA  1     RDW  13.0     Sodium  136     Specific Gravity, UA  1.030     Specimen UA  Urine, Clean Catch     Marijuana (THC) Metabolite Presumptive Positive      Toxicology Information SEE COMMENT  Comment:  This screen includes the following classes of drugs at the   listed cut-off:  Benzodiazepines                  200 ng/ml  Methadone                        300 ng/ml  Cocaine metabolite               300 ng/ml  Opiates                          300 ng/ml  Barbiturates                     200 ng/ml  Amphetamines                    1000 ng/ml  Marijuana metabs (THC)            50 ng/ml  Phencyclidine (PCP)               25 ng/ml  High concentrations of Diphenhydramine may cross-react with  Phencyclidine PCP screening immunoassay giving a false   positive result.  High concentrations of Methylenedioxymethamphetamine (MDMA aka  Ectasy) and other structurally similar compounds may cross-   react with the Amphetamine/Methamphetamine screening   immunoassay giving a false positive result.  A metabolite of the anti-HIV drug Sustiva () may cause  false positive results in the Marijuana metabolite (THC)   screening assay.  Note: This exception list includes only more common   interferants in toxicology screen testing.  Because of many   cross-reactantspositive results on toxicology drug screens   should be confirmed whenever results do not correlate with   clinical presentation.  This report is intended for use in clinical monitoring and  management of patients. It is not intended for use in   employment related drug testing.  Because of any cross-reactants, positive results on toxicology  drug screens should be confirmed whenever results do not  correlate with clinical  presentation.  Presumptive positive results are unconfirmed and may be used   only for medical purposes.        TSH  1.073     Urobilinogen, UA  2.0     WBC, UA  4     WBC  10.27       Labs reviewed    Significant Imaging: None

## 2017-09-26 NOTE — CONSULTS
[facility]  Psychiatry  Consult Note    Patient Name: Nagi Castaneda  MRN: [id number]   Code Status: No Order  Admission Date: 9/25/2017  Hospital Length of Stay: 0 days  Attending Physician: Kun Drake, *  Primary Care Provider: Primary Doctor No    Current Legal Status: PEC    Patient information was obtained from patient, parent and ER records.   Inpatient consult to Psychiatry  Consult performed by: THOMAS COTTO  Consult ordered by: JULIA BROWN        Subjective:     Principal Problem:<principal problem not specified>    Chief Complaint:  SI     HPI: Per Ed:  28 yo male w/ hx of heroine abuse presents accompanied for his mother for evaluation of suicidal thoughts. Pt reports wanting to kill himself w/ a gun but he has no access to a gun. No prior suicide attempts. Reports only feeling depressed and suicidal when he is not doing heroine. Last heroine use was this morning. Normally injects heroine. No prior psych hx or psych medication. Pt mentions recent stressors of missed court date, theft charges, and recent father death one year ago as added stressors. Pt spent the weekend on the streets because his mother kicked him out of the house. Mother reports him having suicidal thoughts.     Per Interview:  Pt is s/p haldol/ativan/benadryl for aggressivebehavior/agitation. He reports that he has been using opiates for >1 year and use has escalated from intranasal to IV over the past month since he was laid off from his job. Pt has been using approx 1/2 gram, per day. Pt saysthat he has been trying to self detox and get into rehab since Friday but hasnot been able to get a bed. He says that he came up with a plan, along with his mom, to say that he was suicidal and come to the Ed in hopes of getting into rehab more quickly. He adamantly denies suicidal ideation, intention, and plan. Denies any prior history of suicidality or Sa. Denies any prior psych history and has  never been to [facility] hospital. Denies other substance use although Utox positive for opiates, cocaine, and THC. Ptis asking to leave at this time.     Collateral: Torie (mom) [phone]  Mom present in waiting room. She reports that she is very concerned about her son. She only recently learned of his heroin use and is trying to help him get into rehab. Mom is concerned for pt's safety. Mom says that she kicked him out of the house due to drug use on Friday and pt texted her on Saturday saying he wanted to kill himself and had a [place] [facility].Furthermore, pt has a history of severe mood swings during which he someitmes becomes violent. Mom is concerned that pt maybe bipolar and reports a positive family history on pt's fathers side of mental illness (no specifics). Mom is tearful and asks that we not send her aysha home as she is afraid he will hurt himself.     Hospital Course: No notes on file         Patient History           Medical as of 9/25/2017    Past Medical History: Patient provided no pertinent medical history.   Pertinent Negatives     Diagnosis Date Noted Comments Source    Anticoagulant long-term use 1/25/2013 -- Provider    Diabetes mellitus 1/25/2013 -- Provider    Hypertension 1/25/2013 -- Provider                  Surgical as of 9/25/2017     Past Surgical History     Procedure Laterality Date Comments Source    KNEE SURGERY -- -- -- Provider                  Family as of 9/25/2017    **None**           Tobacco Use as of 9/25/2017     Smoking Status Smoking Start Date Smoking Quit Date Packs/day Years Used    Current Every Day Smoker -- -- -- --    Types Comments Smokeless Tobacco Status Smokeless Tobacco Quit Date Source     Cigarettes -- Never Used -- Provider            Alcohol Use as of 9/25/2017     Alcohol Use Drinks/Week Alcohol/Week Comments Source    No -- -- -- Provider            Drug Use as of 9/25/2017     Drug Use Types Frequency Comments Source    Yes  Marijuana,  Cocaine 1.0 heroin Provider            Sexual Activity as of 9/25/2017     Sexually Active Birth Control Partners Comments Source    -- -- -- -- Provider            Activities of Daily Living as of 9/25/2017    **None**           Social Documentation as of 9/25/2017    **None**           Occupational as of 9/25/2017    **None**           Socioeconomic as of 9/25/2017     Marital Status Spouse Name Number of Children Years Education Preferred Language Ethnicity Race Source    Single -- -- -- English /White White --         Pertinent History Q A Comments    as of 9/25/2017 Lives with      Place in Birth Order      Lives in      Number of Siblings      Raised by      Legal Involvement      Childhood Trauma      Criminal History of      Financial Status      Highest Level of Education      Does patient have access to a firearm?       Service      Primary Leisure Activity      Spirituality         Review of patient's allergies indicates:  No Known Allergies    No current facility-administered medications on file prior to encounter.      Current Outpatient Prescriptions on File Prior to Encounter   Medication Sig    albuterol 90 mcg/actuation inhaler Inhale 1-2 puffs into the lungs every 6 (six) hours as needed for Wheezing or Shortness of Breath. Rescue     Psychotherapeutics     Start     Stop Route Frequency Ordered    09/25/17 2021  lorazepam (ATIVAN) 2 mg/mL injection     Comments:  Created by cabinet override    09/26 0829 09/25/17 2021 09/25/17 2020  haloperidol lactate (HALDOL) 5 mg/mL injection     Comments:  Created by cabinet override    09/26 0829 09/25/17 2020        Review of Systems   Psychiatric/Behavioral: Positive for agitation and behavioral problems. Negative for decreased concentration, dysphoric mood, hallucinations and suicidal ideas. The patient is not nervous/anxious and is not hyperactive.      Objective:     Vital Signs (Most Recent):  Temp: 97.7 °F (36.5 °C) (09/25/17  1659)  Pulse: 82 (09/25/17 1659)  Resp: 18 (09/25/17 1659)  BP: 126/69 (09/25/17 1659)  SpO2: 99 % (09/25/17 1659) Vital Signs (24h Range):  Temp:  [97.7 °F (36.5 °C)] 97.7 °F (36.5 °C)  Pulse:  [82] 82  Resp:  [18] 18  SpO2:  [99 %] 99 %  BP: (126)/(69) 126/69     Height: 6' (182.9 cm)  Weight: 85.7 kg (189 lb)  Body mass index is 25.63 kg/m².    No intake or output data in the 24 hours ending 09/25/17 2139    Physical Exam   Psychiatric:   Mental Status Exam:  Appearance: unremarkable, age appropriate  Behavior/Cooperation: limited/ appropriate normal, cooperative  Speech: appropriate rate, volume and tone normal tone, normal rate, normal pitch, normal volume  Language: uses words appropriately; NO aphasia or dysarthria  Mood: euthymic  Affect:  congruent with mood and appropriate to situation/content   Thought Process: normal and logical  Thought Content: normal, no suicidality, no homicidality, delusions, or paranoia  Level of Consciousness: Alert and Oriented x3  Memory:  Intact  Attention/concentration: appropriate for age/education.   Fund of Knowledge: appears adequate  Insight:   Intact  Judgment: Intact              Significant Labs:   Last 24 Hours:   Recent Lab Results       09/25/17 1851 09/25/17 1850      Benzodiazepines Negative      Methadone metabolites Negative      Phencyclidine Negative      Acetaminophen (Tylenol), Serum  <3.0  Comment:  Toxic Levels:  Adults (4 hr post-ingestion).........>150 ug/mL  Adults (12 hr post-ingestion)........>40 ug/mL  Peds (2 hr post-ingestion, liquid)...>225 ug/mL  (L)     Albumin  4.3     Alcohol, Medical, Serum  <10     Alkaline Phosphatase  84     ALT  29     Amphetamine Screen, Ur Negative      Anion Gap  11     Appearance, UA  Hazy(A)     AST  25     Bacteria, UA  Moderate(A)     Barbiturate Screen, Ur Negative      Baso #  0.02     Basophil%  0.2     Bilirubin (UA)  Negative     Total Bilirubin  0.7  Comment:  For infants and newborns, interpretation of  results should be based  on gestational age, weight and in agreement with clinical  observations.  Premature Infant recommended reference ranges:  Up to 24 hours.............<8.0 mg/dL  Up to 48 hours............<12.0 mg/dL  3-5 days..................<15.0 mg/dL  6-29 days.................<15.0 mg/dL       BUN, Bld  9     Calcium  9.7     Chloride  102     CO2  23     Cocaine (Metab.) Presumptive Positive      Color, UA  Bee     Creatinine  1.1     Creatinine, Random Ur >450.0  Comment:  The random urine reference ranges provided were established   for 24 hour urine collections.  No reference ranges exist for  random urine specimens.  Correlate clinically.  (H)      Differential Method  Automated     eGFR if African American  >60.0     eGFR if non   >60.0  Comment:  Calculation used to obtain the estimated glomerular filtration  rate (eGFR) is the CKD-EPI equation. Since race is unknown   in our information system, the eGFR values for   -American and Non--American patients are given   for each creatinine result.       Eos #  0.1     Eosinophil%  0.9     Glucose  119(H)     Glucose, UA  Negative     Gran #  7.1     Gran%  68.6     Hematocrit  44.4     Hemoglobin  15.3     Hyaline Casts, UA  44(A)     Ketones, UA  Negative     Leukocytes, UA  Negative     Lymph #  2.3     Lymph%  22.8     MCH  30.5     MCHC  34.5     MCV  88     Microscopic Comment  SEE COMMENT  Comment:  Other formed elements not mentioned in the report are not   present in the microscopic examination.        Mono #  0.8     Mono%  7.4     MPV  9.8     Nitrite, UA  Negative     Occult Blood UA  Negative     Opiate Scrn, Ur Presumptive Positive      pH, UA  5.0     Platelets  316     Potassium  3.4(L)     Total Protein  8.4     Protein, UA  2+  Comment:  Recommend a 24 hour urine protein or a urine   protein/creatinine ratio if globulin induced proteinuria is  clinically suspected.  (A)     RBC  5.02     RBC, UA  1      RDW  13.0     Sodium  136     Specific Gravity, UA  1.030     Specimen UA  Urine, Clean Catch     Marijuana (THC) Metabolite Presumptive Positive      Toxicology Information SEE COMMENT  Comment:  This screen includes the following classes of drugs at the   listed cut-off:  Benzodiazepines                  200 ng/ml  Methadone                        300 ng/ml  Cocaine metabolite               300 ng/ml  Opiates                          300 ng/ml  Barbiturates                     200 ng/ml  Amphetamines                    1000 ng/ml  Marijuana metabs (THC)            50 ng/ml  Phencyclidine (PCP)               25 ng/ml  High concentrations of Diphenhydramine may cross-react with  Phencyclidine PCP screening immunoassay giving a false   positive result.  High concentrations of Methylenedioxymethamphetamine (MDMA aka  Ectasy) and other structurally similar compounds may cross-   react with the Amphetamine/Methamphetamine screening   immunoassay giving a false positive result.  A metabolite of the anti-HIV drug Sustiva () may cause  false positive results in the Marijuana metabolite (THC)   screening assay.  Note: This exception list includes only more common   interferants in toxicology screen testing.  Because of many   cross-reactantspositive results on toxicology drug screens   should be confirmed whenever results do not correlate with   clinical presentation.  This report is intended for use in clinical monitoring and  management of patients. It is not intended for use in   employment related drug testing.  Because of any cross-reactants, positive results on toxicology  drug screens should be confirmed whenever results do not  correlate with clinical presentation.  Presumptive positive results are unconfirmed and may be used   only for medical purposes.        TSH  1.073     Urobilinogen, UA  2.0     WBC, UA  4     WBC  10.27           Significant Imaging: I have reviewed all pertinent imaging  results/findings within the past 24 hours.    Assessment/Plan:     Suicidal ideation    - Although pt is currently denying Si, he did present with CC of Si. Collateral from mom is concerning, especially given that pt reported SI with plan recently to mom. Pt's behavior seems to be escalating which in and of itself is a risk factor. Other risk factors include his polysubstance abuse, high risk behaviors, and impulsivity (as demonstrated while in ED with aggression - pt broke computer monitor when he threw a chair at it because mom would not agree that he is not suicidal).  - Continue PEC as pt is currently a danger to self. Plan to observe in [facility] overnight and re-evaluate in the morning.           Polysubstance abuse    - Pt last used opiates this morning and denies current withdrawal symtpoms.  - OPIATE WITHDRAWAL PRECAUTIONS    -clonidine 0.1 mg po q4 hours prn opiate withdrawal    -dicylomine 10 mg q6 hours prn muscle cramps    -loperamide 2 mg q 1 hour prn diarrhea (max= 16 mg/24 hours)    -methocarbamol 500 mg po q 6 hours prn muscle spasm                 Vargas Zimmerman MD   Psychiatry  [facility]

## 2017-09-26 NOTE — ASSESSMENT & PLAN NOTE
- Patient is currently denying SI and he states that he said it to get into a drug rehab facility.  His Mother, Torie, Corroborates this.  - Rescind PEC as pt is not currently a danger to self.   - Provide patient with resources for drug rehab in the area  - Case discussed with Dr Tanner

## 2017-09-26 NOTE — CONSULTS
[facility]  Psychiatry  Consult Note    Patient Name: Nagi Castaneda  MRN: [id number]   Code Status: No Order  Admission Date: 9/25/2017  Hospital Length of Stay: 0 days  Attending Physician: No att. providers found  Primary Care Provider: Primary Doctor No    Current Legal Status: PEC    Patient information was obtained from patient, parent and ER records.   Consults  Subjective:     Principal Problem:<principal problem not specified>    Chief Complaint:  Suicidal Ideations and Heroin Addiction    HPI: Per Ed:  26 yo male w/ hx of heroine abuse presents accompanied for his mother for evaluation of suicidal thoughts. Pt reports wanting to kill himself w/ a gun but he has no access to a gun. No prior suicide attempts. Reports only feeling depressed and suicidal when he is not doing heroine. Last heroine use was this morning. Normally injects heroine. No prior psych hx or psych medication. Pt mentions recent stressors of missed court date, theft charges, and recent father death one year ago as added stressors. Pt spent the weekend on the streets because his mother kicked him out of the house. Mother reports him having suicidal thoughts.     Per Interview by Psychiatric Resident:  Pt is s/p haldol/ativan/benadryl for aggressivebehavior/agitation. He reports that he has been using opiates for >1 year and use has escalated from intranasal to IV over the past month since he was laid off from his job. Pt has been using approx 1/2 gram, per day. Pt saysthat he has been trying to self detox and get into rehab since Friday but hasnot been able to get a bed. He says that he came up with a plan, along with his mom, to say that he was suicidal and come to the Ed in hopes of getting into rehab more quickly. He adamantly denies suicidal ideation, intention, and plan. Denies any prior history of suicidality or Sa. Denies any prior psych history and has never been to psych hospital. Denies other substance use  although Utox positive for opiates, cocaine, and THC. Ptis asking to leave at this time.     Collateral: Torie (mom) [phone]  Mom present in waiting room. She reports that she is very concerned about her son. She only recently learned of his heroin use and is trying to help him get into rehab. Mom is concerned for pt's safety. Mom says that she kicked him out of the house due to drug use on Friday and pt texted her on Saturday saying he wanted to kill himself and had a [place].Furthermore, pt has a history of severe mood swings during which he someitmes becomes violent. Mom is concerned that pt maybe bipolar and reports a positive family history on pt's fathers side of mental illness (no specifics). Mom is tearful and asks that we not send her aysha home as she is afraid he will hurt himself.     Upon interview this morning Mr Castaneda was alert and oriented.  He was calm and cooperative.  He stated that he felt better today and that he wanted to enter a drug rehab facility.  He denied SI and claimed that he said it to get into a drug rehab.  He stated that a counselor at [facility] told him to do it.  He stated that his mood is good.  He admits to heroin use over the last 6 month with IV use beginning 1 month ago.  He denies HI and A/V hallucinations.    Collateral:  Spoke with his Mother, Torie, this morning.  She didn't feel that he needed nor would benefit from inpatient psychiatric treatment.  She felt that he was safe and wouldn't harm himself.  She verified that the counselor at [facility] told him to say that he was suicidal to get into drug rehab.      Hospital Course: No notes on file         Patient History           Medical as of 9/26/2017    Past Medical History: Patient provided no pertinent medical history.   Pertinent Negatives     Diagnosis Date Noted Comments Source    Anticoagulant long-term use 1/25/2013 -- Provider    Diabetes mellitus 1/25/2013 -- Provider    Hypertension  1/25/2013 -- Provider                  Surgical as of 9/26/2017     Past Surgical History     Procedure Laterality Date Comments Source    KNEE SURGERY -- -- -- Provider                  Family as of 9/26/2017    **None**           Tobacco Use as of 9/26/2017     Smoking Status Smoking Start Date Smoking Quit Date Packs/day Years Used    Current Every Day Smoker -- -- -- --    Types Comments Smokeless Tobacco Status Smokeless Tobacco Quit Date Source     Cigarettes -- Never Used -- Provider            Alcohol Use as of 9/26/2017     Alcohol Use Drinks/Week Alcohol/Week Comments Source    No -- -- -- Provider            Drug Use as of 9/26/2017     Drug Use Types Frequency Comments Source    Yes  Marijuana, Cocaine 1.0 heroin Provider            Sexual Activity as of 9/26/2017     Sexually Active Birth Control Partners Comments Source    -- -- -- -- Provider            Activities of Daily Living as of 9/26/2017    **None**           Social Documentation as of 9/26/2017    **None**           Occupational as of 9/26/2017    **None**           Socioeconomic as of 9/26/2017     Marital Status Spouse Name Number of Children Years Education Preferred Language Ethnicity Race Source    Single -- -- -- English /White White --         Pertinent History Q A Comments    as of 9/26/2017 Lives with      Place in Birth Order      Lives in      Number of Siblings      Raised by      Legal Involvement      Childhood Trauma      Criminal History of      Financial Status      Highest Level of Education      Does patient have access to a firearm?       Service      Primary Leisure Activity      Spirituality         Review of patient's allergies indicates:  No Known Allergies    No current facility-administered medications on file prior to encounter.      Current Outpatient Prescriptions on File Prior to Encounter   Medication Sig    albuterol 90 mcg/actuation inhaler Inhale 1-2 puffs into the lungs every 6 (six) hours  as needed for Wheezing or Shortness of Breath. Rescue     Psychotherapeutics     Start     Stop Route Frequency Ordered    09/25/17 2021  lorazepam (ATIVAN) 2 mg/mL injection     Comments:  Created by cabinet override    09/26 0829 09/25/17 2021        Review of Systems   Psychiatric/Behavioral: Negative.      Objective:     Vital Signs (Most Recent):  Temp: 98.7 °F (37.1 °C) (09/26/17 1031)  Pulse: 91 (09/26/17 1031)  Resp: 16 (09/26/17 1031)  BP: 106/61 (09/26/17 1031)  SpO2: 98 % (09/25/17 2310) Vital Signs (24h Range):  Temp:  [97.6 °F (36.4 °C)-98.7 °F (37.1 °C)] 98.7 °F (37.1 °C)  Pulse:  [76-91] 91  Resp:  [16-18] 16  SpO2:  [98 %-99 %] 98 %  BP: (106-126)/(61-69) 106/61     Height: 6' (182.9 cm)  Weight: 85.7 kg (189 lb)  Body mass index is 25.63 kg/m².    No intake or output data in the 24 hours ending 09/26/17 1234    Physical Exam   Psychiatric: He has a normal mood and affect. His speech is normal and behavior is normal. Judgment and thought content normal. Cognition and memory are normal.       NEUROLOGICAL EXAMINATION:     MENTAL STATUS   Speech: speech is normal       Significant Labs:   Last 24 Hours:   Recent Lab Results       09/25/17 1851 09/25/17 1850      Benzodiazepines Negative      Methadone metabolites Negative      Phencyclidine Negative      Acetaminophen (Tylenol), Serum  <3.0  Comment:  Toxic Levels:  Adults (4 hr post-ingestion).........>150 ug/mL  Adults (12 hr post-ingestion)........>40 ug/mL  Peds (2 hr post-ingestion, liquid)...>225 ug/mL  (L)     Albumin  4.3     Alcohol, Medical, Serum  <10     Alkaline Phosphatase  84     ALT  29     Amphetamine Screen, Ur Negative      Anion Gap  11     Appearance, UA  Hazy(A)     AST  25     Bacteria, UA  Moderate(A)     Barbiturate Screen, Ur Negative      Baso #  0.02     Basophil%  0.2     Bilirubin (UA)  Negative     Total Bilirubin  0.7  Comment:  For infants and newborns, interpretation of results should be based  on gestational age,  weight and in agreement with clinical  observations.  Premature Infant recommended reference ranges:  Up to 24 hours.............<8.0 mg/dL  Up to 48 hours............<12.0 mg/dL  3-5 days..................<15.0 mg/dL  6-29 days.................<15.0 mg/dL       BUN, Bld  9     Calcium  9.7     Chloride  102     CO2  23     Cocaine (Metab.) Presumptive Positive      Color, UA  Bee     Creatinine  1.1     Creatinine, Random Ur >450.0  Comment:  The random urine reference ranges provided were established   for 24 hour urine collections.  No reference ranges exist for  random urine specimens.  Correlate clinically.  (H)      Differential Method  Automated     eGFR if African American  >60.0     eGFR if non   >60.0  Comment:  Calculation used to obtain the estimated glomerular filtration  rate (eGFR) is the CKD-EPI equation. Since race is unknown   in our information system, the eGFR values for   -American and Non--American patients are given   for each creatinine result.       Eos #  0.1     Eosinophil%  0.9     Glucose  119(H)     Glucose, UA  Negative     Gran #  7.1     Gran%  68.6     Hematocrit  44.4     Hemoglobin  15.3     Hyaline Casts, UA  44(A)     Ketones, UA  Negative     Leukocytes, UA  Negative     Lymph #  2.3     Lymph%  22.8     MCH  30.5     MCHC  34.5     MCV  88     Microscopic Comment  SEE COMMENT  Comment:  Other formed elements not mentioned in the report are not   present in the microscopic examination.        Mono #  0.8     Mono%  7.4     MPV  9.8     Nitrite, UA  Negative     Occult Blood UA  Negative     Opiate Scrn, Ur Presumptive Positive      pH, UA  5.0     Platelets  316     Potassium  3.4(L)     Total Protein  8.4     Protein, UA  2+  Comment:  Recommend a 24 hour urine protein or a urine   protein/creatinine ratio if globulin induced proteinuria is  clinically suspected.  (A)     RBC  5.02     RBC, UA  1     RDW  13.0     Sodium  136     Specific  Gravity, UA  1.030     Specimen UA  Urine, Clean Catch     Marijuana (THC) Metabolite Presumptive Positive      Toxicology Information SEE COMMENT  Comment:  This screen includes the following classes of drugs at the   listed cut-off:  Benzodiazepines                  200 ng/ml  Methadone                        300 ng/ml  Cocaine metabolite               300 ng/ml  Opiates                          300 ng/ml  Barbiturates                     200 ng/ml  Amphetamines                    1000 ng/ml  Marijuana metabs (THC)            50 ng/ml  Phencyclidine (PCP)               25 ng/ml  High concentrations of Diphenhydramine may cross-react with  Phencyclidine PCP screening immunoassay giving a false   positive result.  High concentrations of Methylenedioxymethamphetamine (MDMA aka  Ectasy) and other structurally similar compounds may cross-   react with the Amphetamine/Methamphetamine screening   immunoassay giving a false positive result.  A metabolite of the anti-HIV drug Sustiva () may cause  false positive results in the Marijuana metabolite (THC)   screening assay.  Note: This exception list includes only more common   interferants in toxicology screen testing.  Because of many   cross-reactantspositive results on toxicology drug screens   should be confirmed whenever results do not correlate with   clinical presentation.  This report is intended for use in clinical monitoring and  management of patients. It is not intended for use in   employment related drug testing.  Because of any cross-reactants, positive results on toxicology  drug screens should be confirmed whenever results do not  correlate with clinical presentation.  Presumptive positive results are unconfirmed and may be used   only for medical purposes.        TSH  1.073     Urobilinogen, UA  2.0     WBC, UA  4     WBC  10.27       Labs reviewed    Significant Imaging: None    Assessment/Plan:     Suicidal ideation    - Patient is currently  denying SI and he states that he said it to get into a drug rehab facility.  His Mother, Torie, Corroborates this.  - Rescind PEC as pt is not currently a danger to self.   - Provide patient with resources for drug rehab in the area  - Case discussed with Dr Tanner        Polysubstance abuse    - Pt last used opiates this morning and denies current withdrawal symtpoms.  - OPIATE WITHDRAWAL PRECAUTIONS    -clonidine 0.1 mg po q4 hours prn opiate withdrawal    -dicylomine 10 mg q6 hours prn muscle cramps    -loperamide 2 mg q 1 hour prn diarrhea (max= 16 mg/24 hours)    -methocarbamol 500 mg po q 6 hours prn muscle spasm                 Benita Scales NP   Psychiatry  [facility]

## 2017-09-26 NOTE — ED NOTES
Patient transferred to [facility] at approximately 2348. Transported via wheelchair per CR Thayer and two security officers. Person searched for contraband upon arrival by PRACHI Jalloh RN and RIVER Lara. 1 bag of belongings received upon arrival. Belongings secured in locked patient closet.

## 2017-09-26 NOTE — ED NOTES
Report from night [facility] nurse.  Informed doctor requested patient to be held over night in [facility] for re-evaluation this morning.  Patient resting quietly in bed with eyes closed with even and unlabored respirations.  No distress noted.

## 2017-09-26 NOTE — ED NOTES
"Patient agitated, angry, mother left room and patient threw chair and it hit the computer monitor in room, IT notified. Patient yelling "I am not suicidal, yall cant hold me here against my will". Patient encouraged to calm down but refuses. MD notified, B52 ordered.   "

## 2017-09-26 NOTE — PROVIDER PROGRESS NOTES - EMERGENCY DEPT.
Encounter Date: 9/25/2017    ED Physician Progress Notes       SCRIBE NOTE: I, Margie Brown, am scribing for, and in the presence of,  Dr. Atwood.  Physician Statement: I, Dr. Atwood, personally performed the services described in this documentation as scribed by Margie Brown in my presence, and it is both accurate and complete.     Physician Note:   Notified by Benita, Psychiatry Nurse Practitioner, that they're recommending to rescend PEC and provide resources for outpatient rehab.

## 2017-09-26 NOTE — HPI
Per Ed:  26 yo male w/ hx of heroine abuse presents accompanied for his mother for evaluation of suicidal thoughts. Pt reports wanting to kill himself w/ a gun but he has no access to a gun. No prior suicide attempts. Reports only feeling depressed and suicidal when he is not doing heroine. Last heroine use was this morning. Normally injects heroine. No prior psych hx or psych medication. Pt mentions recent stressors of missed court date, theft charges, and recent father death one year ago as added stressors. Pt spent the weekend on the streets because his mother kicked him out of the house. Mother reports him having suicidal thoughts.     Per Interview by Psychiatric Resident:  Pt is s/p haldol/ativan/benadryl for aggressivebehavior/agitation. He reports that he has been using opiates for >1 year and use has escalated from intranasal to IV over the past month since he was laid off from his job. Pt has been using approx 1/2 gram, per day. Pt saysthat he has been trying to self detox and get into rehab since Friday but hasnot been able to get a bed. He says that he came up with a plan, along with his mom, to say that he was suicidal and come to the Ed in hopes of getting into rehab more quickly. He adamantly denies suicidal ideation, intention, and plan. Denies any prior history of suicidality or Sa. Denies any prior psych history and has never been to psych hospital. Denies other substance use although Utox positive for opiates, cocaine, and THC. Ptis asking to leave at this time.     Collateral: Torie (mom) [phone]  Mom present in waiting room. She reports that she is very concerned about her son. She only recently learned of his heroin use and is trying to help him get into rehab. Mom is concerned for pt's safety. Mom says that she kicked him out of the house due to drug use on Friday and pt texted her on Saturday saying he wanted to kill himself and had a [place].Furthermore, pt has a  history of severe mood swings during which he someitmes becomes violent. Mom is concerned that pt maybe bipolar and reports a positive family history on pt's fathers side of mental illness (no specifics). Mom is tearful and asks that we not send her aysha home as she is afraid he will hurt himself.     Upon interview this morning Mr Castaneda was alert and oriented.  He was calm and cooperative.  He stated that he felt better today and that he wanted to enter a drug rehab facility.  He denied SI and claimed that he said it to get into a drug rehab.  He stated that a counselor at [place] told him to do it.  He stated that his mood is good.  He admits to heroin use over the last 6 month with IV use beginning 1 month ago.  He denies HI and A/V hallucinations.    Collateral:  Spoke with his Mother, Torie, this morning.  She didn't feel that he needed nor would benefit from inpatient psychiatric treatment.  She felt that he was safe and wouldn't harm himself.  She verified that the counselor at [place] told him to say that he was suicidal to get into drug rehab.

## 2017-09-26 NOTE — ASSESSMENT & PLAN NOTE
- Pt last used opiates this morning and denies current withdrawal symtpoms.  - OPIATE WITHDRAWAL PRECAUTIONS    -clonidine 0.1 mg po q4 hours prn opiate withdrawal    -dicylomine 10 mg q6 hours prn muscle cramps    -loperamide 2 mg q 1 hour prn diarrhea (max= 16 mg/24 hours)    -methocarbamol 500 mg po q 6 hours prn muscle spasm

## 2017-09-26 NOTE — ED NOTES
Josh Deng at the bedside, patient calm and sitting in eye chair at this time, security continues to be at bedside.

## 2017-09-26 NOTE — ED NOTES
Patient resting with eyes closed, resp even and unlabored, sitter and security remains at the bedside.

## 2017-09-26 NOTE — ASSESSMENT & PLAN NOTE
- Although pt is currently denying Si, he did present with CC of Si. Collateral from mom is concerning, especially given that pt reported SI with plan recently to mom. Pt's behavior seems to be escalating which in and of itself is a risk factor. Other risk factors include his polysubstance abuse, high risk behaviors, and impulsivity (as demonstrated while in ED with aggression - pt broke computer monitor when he threw a chair at it because mom would not agree that he is not suicidal).  - Continue PEC as pt is currently a danger to self. Plan to observe in [facility] overnight and re-evaluate in the morning.

## 2017-09-26 NOTE — ED NOTES
Patient sitting in eye chair, calm and cooperative at this time. Mother at the bedside. Sitter Jud at the bedside.

## 2018-01-20 ENCOUNTER — NURSE TRIAGE (OUTPATIENT)
Dept: ADMINISTRATIVE | Facility: CLINIC | Age: 28
End: 2018-01-20

## 2018-01-21 NOTE — TELEPHONE ENCOUNTER
Patient's mother stated that she will bring her son to ED tomorrow because he is a heroin addict. No fluids or food in days per the caller. Also, he has dark, concentrated urine. No PCP to assist with interventions. I suggested the ED tonight but she said that the patient will refuse because stated that he will get help tomorrow. Instructed to call back with any additional problems and/or concerns and she verbalized understanding.    Reason for Disposition   General information question, no triage required and triager able to answer question    Protocols used: ST INFORMATION ONLY CALL-A-AH

## 2018-03-30 PROCEDURE — 99285 EMERGENCY DEPT VISIT HI MDM: CPT

## 2018-03-30 PROCEDURE — 99284 EMERGENCY DEPT VISIT MOD MDM: CPT

## 2018-03-31 ENCOUNTER — HOSPITAL ENCOUNTER (EMERGENCY)
Facility: HOSPITAL | Age: 28
Discharge: HOME | End: 2018-03-31
Attending: EMERGENCY MEDICINE
Payer: MEDICAID

## 2018-03-31 VITALS
HEART RATE: 111 BPM | WEIGHT: 205 LBS | BODY MASS INDEX: 27.77 KG/M2 | RESPIRATION RATE: 20 BRPM | HEIGHT: 72 IN | DIASTOLIC BLOOD PRESSURE: 72 MMHG | OXYGEN SATURATION: 95 % | SYSTOLIC BLOOD PRESSURE: 150 MMHG | TEMPERATURE: 98 F

## 2018-03-31 DIAGNOSIS — T50.901A ACCIDENTAL DRUG OVERDOSE, INITIAL ENCOUNTER: Primary | ICD-10-CM

## 2018-03-31 PROCEDURE — 25000003 PHARM REV CODE 250: Performed by: EMERGENCY MEDICINE

## 2018-03-31 RX ORDER — ONDANSETRON 4 MG/1
4 TABLET, ORALLY DISINTEGRATING ORAL
Status: COMPLETED | OUTPATIENT
Start: 2018-03-31 | End: 2018-03-31

## 2018-03-31 RX ADMIN — ONDANSETRON 4 MG: 4 TABLET, ORALLY DISINTEGRATING ORAL at 02:03
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
## 2018-03-31 NOTE — ED PROVIDER NOTES
"Encounter Date: 3/30/2018    SCRIBE #1 NOTE: I, Rosanne Le, am scribing for, and in the presence of, Dr. Jalloh.       History     Chief Complaint   Patient presents with    Drug Overdose     pt. reports sober for 3 weeks and used heroin tonight. pt. was found with altered mental status by family member. [id number] per ems.      27 y.o. male with no significant PMHx on record who presents to the ED with a complaint of drug overdosed.  Pt states he was sober for 3.5 months but reports he used heroin today because he was stressed out by his mom and brother tonight.  Pt states he did heroin right before going to bed so he can sleep well.  His mom had trouble waking him up and decided to call EMS.  Pt became conscious and refused narcan.  He states he was only in a "deep sleep."  Pt also reports he did cocaine earlier today.  No other symptoms reported at this time besides dry mouth.        The history is provided by the patient.     Review of patient's allergies indicates:  No Known Allergies  No past medical history on file.  Past Surgical History:   Procedure Laterality Date    KNEE SURGERY       No family history on file.  Social History   Substance Use Topics    Smoking status: Current Every Day Smoker     Types: Cigarettes    Smokeless tobacco: Never Used    Alcohol use No     Review of Systems   All other systems reviewed and are negative.      Physical Exam     Initial Vitals   BP Pulse Resp Temp SpO2   -- -- -- -- --      MAP       --         Physical Exam    Nursing note and vitals reviewed.  Constitutional: He appears well-developed and well-nourished. He appears lethargic. He is easily aroused.   Pt is awake.   HENT:   Head: Normocephalic and atraumatic.   Eyes: Conjunctivae are normal.   Neck: Neck supple.   Cardiovascular: Normal rate, regular rhythm, normal heart sounds and intact distal pulses. Exam reveals no gallop and no friction rub.    No murmur heard.  Pulmonary/Chest: Breath sounds normal. " He has no wheezes. He has no rhonchi. He has no rales.   Abdominal: Soft. He exhibits no distension. There is no tenderness.   Musculoskeletal: Normal range of motion.   Neurological: He is oriented to person, place, and time and easily aroused. He appears lethargic.   Skin: No rash noted. No erythema.   Psychiatric: He has a normal mood and affect.         ED Course   Procedures  Labs Reviewed - No data to display          Medical Decision Making:   History:   Old Medical Records: I decided to obtain old medical records.  Initial Assessment:   27 y.o. male presents to the ED via EMS called by his mom s/p using heroin tonight after 3.5 months sober  patient states he became angry at his mother and father for accusing him for substance abuse at which time he called his friend and decided to do some heroin.  Patient also admits to doing cocaine earlier in the day.  Differential Diagnosis:   CVA, TIA, syncope, drug overdose  ED Management:  As patient provided history of his ingestion and did not require Narcan, it was elected to observe the patient in the emergency department until he became more awake and discharge him in the care of his mother.            I, Dr. Trev Jalloh, personally performed the services described in this documentation. All medical record entries made by the scribe were at my direction and in my presence. I have reviewed the chart and agree that the record reflects my personal performance and is accurate and complete. Trev Jalloh MD             Clinical Impression:drug overdose   There were no encounter diagnoses.                           Trev Jalloh MD  03/31/18 0053       Trev Jalloh MD  03/31/18 0236

## 2018-03-31 NOTE — ED NOTES
Patient placed on stretcher in hallway. Given pillow and blanket. Let patient know he would be observed for a little while before being discharge. Patient very cooperative.

## 2018-04-15 ENCOUNTER — HOSPITAL ENCOUNTER (EMERGENCY)
Facility: HOSPITAL | Age: 28
Discharge: HOME | End: 2018-04-15
Attending: EMERGENCY MEDICINE
Payer: MEDICAID

## 2018-04-15 VITALS
DIASTOLIC BLOOD PRESSURE: 67 MMHG | HEART RATE: 94 BPM | BODY MASS INDEX: 27.09 KG/M2 | HEIGHT: 72 IN | SYSTOLIC BLOOD PRESSURE: 120 MMHG | RESPIRATION RATE: 15 BRPM | TEMPERATURE: 98 F | WEIGHT: 200 LBS | OXYGEN SATURATION: 98 %

## 2018-04-15 DIAGNOSIS — R40.4 ALTERED AWARENESS, TRANSIENT: ICD-10-CM

## 2018-04-15 DIAGNOSIS — T40.1X1A ACCIDENTAL OVERDOSE OF HEROIN, INITIAL ENCOUNTER: Primary | ICD-10-CM

## 2018-04-15 LAB
ALBUMIN SERPL BCP-MCNC: 3.7 G/DL
ALP SERPL-CCNC: 65 U/L
ALT SERPL W/O P-5'-P-CCNC: 25 U/L
AMORPH CRY URNS QL MICRO: NORMAL
AMPHET+METHAMPHET UR QL: NEGATIVE
ANION GAP SERPL CALC-SCNC: 6 MMOL/L
AST SERPL-CCNC: 26 U/L
BACTERIA #/AREA URNS HPF: NORMAL /HPF
BARBITURATES UR QL SCN>200 NG/ML: NEGATIVE
BASOPHILS # BLD AUTO: 0.03 K/UL
BASOPHILS NFR BLD: 0.4 %
BENZODIAZ UR QL SCN>200 NG/ML: NEGATIVE
BILIRUB SERPL-MCNC: 0.4 MG/DL
BILIRUB UR QL STRIP: NEGATIVE
BUN SERPL-MCNC: 9 MG/DL
BZE UR QL SCN: NEGATIVE
CALCIUM SERPL-MCNC: 8.8 MG/DL
CANNABINOIDS UR QL SCN: NEGATIVE
CHLORIDE SERPL-SCNC: 106 MMOL/L
CLARITY UR: CLEAR
CO2 SERPL-SCNC: 26 MMOL/L
COLOR UR: YELLOW
CREAT SERPL-MCNC: 0.8 MG/DL
CREAT UR-MCNC: 58.3 MG/DL
DIFFERENTIAL METHOD: ABNORMAL
EOSINOPHIL # BLD AUTO: 0.2 K/UL
EOSINOPHIL NFR BLD: 2.8 %
ERYTHROCYTE [DISTWIDTH] IN BLOOD BY AUTOMATED COUNT: 13.1 %
EST. GFR  (AFRICAN AMERICAN): >60 ML/MIN/1.73 M^2
EST. GFR  (NON AFRICAN AMERICAN): >60 ML/MIN/1.73 M^2
ETHANOL SERPL-MCNC: <10 MG/DL
GLUCOSE SERPL-MCNC: 63 MG/DL
GLUCOSE UR QL STRIP: NEGATIVE
HCT VFR BLD AUTO: 41.7 %
HGB BLD-MCNC: 13.7 G/DL
HGB UR QL STRIP: NEGATIVE
HYALINE CASTS #/AREA URNS LPF: 0 /LPF
KETONES UR QL STRIP: NEGATIVE
LEUKOCYTE ESTERASE UR QL STRIP: NEGATIVE
LYMPHOCYTES # BLD AUTO: 2.1 K/UL
LYMPHOCYTES NFR BLD: 29.6 %
MCH RBC QN AUTO: 30.9 PG
MCHC RBC AUTO-ENTMCNC: 32.9 G/DL
MCV RBC AUTO: 94 FL
METHADONE UR QL SCN>300 NG/ML: NEGATIVE
MICROSCOPIC COMMENT: NORMAL
MONOCYTES # BLD AUTO: 0.4 K/UL
MONOCYTES NFR BLD: 5.1 %
NEUTROPHILS # BLD AUTO: 4.4 K/UL
NEUTROPHILS NFR BLD: 61.8 %
NITRITE UR QL STRIP: NEGATIVE
OPIATES UR QL SCN: NORMAL
PCP UR QL SCN>25 NG/ML: NEGATIVE
PH UR STRIP: 6 [PH] (ref 5–8)
PLATELET # BLD AUTO: 206 K/UL
PMV BLD AUTO: 9.3 FL
POCT GLUCOSE: 104 MG/DL (ref 70–110)
POTASSIUM SERPL-SCNC: 4.6 MMOL/L
PROT SERPL-MCNC: 6.8 G/DL
PROT UR QL STRIP: ABNORMAL
RBC # BLD AUTO: 4.44 M/UL
RBC #/AREA URNS HPF: 1 /HPF (ref 0–4)
SODIUM SERPL-SCNC: 138 MMOL/L
SP GR UR STRIP: 1.01 (ref 1–1.03)
TOXICOLOGY INFORMATION: NORMAL
URN SPEC COLLECT METH UR: ABNORMAL
UROBILINOGEN UR STRIP-ACNC: NEGATIVE EU/DL
WBC # BLD AUTO: 7.1 K/UL
WBC #/AREA URNS HPF: 2 /HPF (ref 0–5)

## 2018-04-15 PROCEDURE — 25000003 PHARM REV CODE 250: Performed by: EMERGENCY MEDICINE

## 2018-04-15 PROCEDURE — 82962 GLUCOSE BLOOD TEST: CPT

## 2018-04-15 PROCEDURE — 81000 URINALYSIS NONAUTO W/SCOPE: CPT | Mod: 59

## 2018-04-15 PROCEDURE — 80320 DRUG SCREEN QUANTALCOHOLS: CPT

## 2018-04-15 PROCEDURE — 99284 EMERGENCY DEPT VISIT MOD MDM: CPT | Mod: 25

## 2018-04-15 PROCEDURE — 93005 ELECTROCARDIOGRAM TRACING: CPT

## 2018-04-15 PROCEDURE — 80307 DRUG TEST PRSMV CHEM ANLYZR: CPT

## 2018-04-15 PROCEDURE — 85025 COMPLETE CBC W/AUTO DIFF WBC: CPT

## 2018-04-15 PROCEDURE — 80053 COMPREHEN METABOLIC PANEL: CPT

## 2018-04-15 PROCEDURE — 93010 ELECTROCARDIOGRAM REPORT: CPT | Mod: ,,, | Performed by: INTERNAL MEDICINE

## 2018-04-15 PROCEDURE — 96360 HYDRATION IV INFUSION INIT: CPT

## 2018-04-15 RX ADMIN — SODIUM CHLORIDE 1000 ML: 0.9 INJECTION, SOLUTION INTRAVENOUS at 11:04

## 2018-04-15 NOTE — ED NOTES
Pt mother called back and stated no one in his family wants to come  the pt, she stated he needs to find his own way home and hung up.

## 2018-04-15 NOTE — ED PROVIDER NOTES
Encounter Date: 4/15/2018    SCRIBE #1 NOTE: I, Laurie Jimenez, am scribing for, and in the presence of,  Dr. Suárez. I have scribed the entire note.       History     Chief Complaint   Patient presents with    Drug Overdose     Heroine overdose.  Unresponsive when EMS arrived.  Given 6 mg of Narcan and patient is now awake.     This is a 27 y.o. male with PMHx of drug abuse who presents to the ED via EMS s/p Heroin overdose. Per EMS he was found unresponsive. He was given 6mg Narcan. Pt reports he was alone. Pt is asymptomatic; denies any symptoms at this time. Pt denies fever, abdominal pain, and weakness. NKDA. Pt reports no other medical complaints or injuries at this time. Denies intentional overdose, depression, SI/HI.       The history is provided by the patient and the EMS personnel.     Review of patient's allergies indicates:  No Known Allergies  Past Medical History:   Diagnosis Date    Drug abuse      Past Surgical History:   Procedure Laterality Date    KNEE SURGERY       History reviewed. No pertinent family history.  Social History   Substance Use Topics    Smoking status: Current Every Day Smoker     Types: Cigarettes    Smokeless tobacco: Never Used    Alcohol use No     Review of Systems   Constitutional: Negative for chills, fatigue and fever.   HENT: Negative for congestion, sore throat and voice change.    Eyes: Negative for photophobia, pain and redness.   Respiratory: Negative for cough, choking and shortness of breath.    Cardiovascular: Negative for chest pain, palpitations and leg swelling.   Gastrointestinal: Negative for abdominal pain, nausea and vomiting.   Genitourinary: Negative for dysuria, frequency and urgency.   Musculoskeletal: Negative for back pain, neck pain and neck stiffness.   Neurological: Negative for seizures, speech difficulty, light-headedness, numbness and headaches.   All other systems reviewed and are negative.      Physical Exam     Initial Vitals [04/15/18  1100]   BP Pulse Resp Temp SpO2   125/66 95 16 98.2 °F (36.8 °C) 98 %      MAP       85.67         Physical Exam    Nursing note and vitals reviewed.  Constitutional: He appears well-developed and well-nourished. No distress.   HENT:   Head: Normocephalic and atraumatic.   Oropharynx clear; Dry MM   Eyes: Conjunctivae and EOM are normal. Pupils are equal, round, and reactive to light.   Neck: Normal range of motion. Neck supple. No tracheal deviation present.   Cardiovascular: Normal rate, regular rhythm, normal heart sounds and intact distal pulses.   Pulmonary/Chest: Breath sounds normal. No respiratory distress. He has no wheezes. He has no rhonchi. He has no rales.   Abdominal: Soft. Bowel sounds are normal. He exhibits no distension. There is no tenderness.   Musculoskeletal: Normal range of motion. He exhibits no edema or tenderness.   Neurological: He is alert and oriented to person, place, and time. He has normal strength. No cranial nerve deficit or sensory deficit.   Skin: Skin is warm and dry. Capillary refill takes less than 2 seconds.         ED Course   Procedures  Labs Reviewed   CBC W/ AUTO DIFFERENTIAL - Abnormal; Notable for the following:        Result Value    RBC 4.44 (*)     Hemoglobin 13.7 (*)     All other components within normal limits   COMPREHENSIVE METABOLIC PANEL - Abnormal; Notable for the following:     Glucose 63 (*)     Anion Gap 6 (*)     All other components within normal limits   DRUG SCREEN PANEL, URINE EMERGENCY   ALCOHOL,MEDICAL (ETHANOL)   URINALYSIS   URINALYSIS MICROSCOPIC     EKG Readings: (Independently Interpreted)   Initial Reading: No STEMI. Previous EKG: Compared with most recent EKG Previous EKG Date: 9/25/17 (Unchanged). Rhythm: Normal Sinus Rhythm. Heart Rate: 93. Ectopy: No Ectopy. Conduction: Early repolarization. ST Segments: Normal ST Segments. T Waves: Normal. Axis: Normal.     Imaging Results          X-Ray Chest AP Portable (Final result)  Result time  04/15/18 11:39:34    Final result by Laurie Andre MD (04/15/18 11:39:34)                 Impression:      Clear lungs.      Electronically signed by: Laurie Andre MD  Date:    04/15/2018  Time:    11:39             Narrative:    EXAMINATION:  XR CHEST AP PORTABLE    CLINICAL HISTORY:  Altered mental status;    TECHNIQUE:  Single frontal view of the chest was performed.    COMPARISON:  Prior dated 03/23/2017    FINDINGS:  The mediastinal structures are midline.  The cardiac silhouette is not well evaluated due to AP technique.  The lungs appear grossly clear.    No osseous abnormalities are seen.                                X-Rays:   Independently Interpreted Readings:   Chest X-Ray: CXR Interpreted by radiologist and visualized by me:      Imaging Results          X-Ray Chest AP Portable (Final result)  Result time 04/15/18 11:39:34    Final result by Laurie Andre MD (04/15/18 11:39:34)                 Impression:      Clear lungs.      Electronically signed by: Laurie Andre MD  Date:    04/15/2018  Time:    11:39             Narrative:    EXAMINATION:  XR CHEST AP PORTABLE    CLINICAL HISTORY:  Altered mental status;    TECHNIQUE:  Single frontal view of the chest was performed.    COMPARISON:  Prior dated 03/23/2017    FINDINGS:  The mediastinal structures are midline.  The cardiac silhouette is not well evaluated due to AP technique.  The lungs appear grossly clear.    No osseous abnormalities are seen.                                Medical Decision Making:   Initial Assessment:   This is a 27 y.o. male with PMHx of drug abuse who presents to the ED via EMS s/p Heroin overdose.  Differential Diagnosis:   ICH, CVA, SAH, electrolyte dyscrasia, dehydration, substance abuse  Independently Interpreted Test(s):   I have ordered and independently interpreted X-rays - see prior notes.  I have ordered and independently interpreted EKG Reading(s) - see prior notes  Clinical Tests:   Lab Tests:  Reviewed       <> Summary of Lab: +opiates; otherwise benign  ED Management:  Patient was monitored in the department for a few hours.  He has had no relapse of his unresponsiveness. I informed him of the risks of using illicit drugs and encouraged him to f/u with rehab or his PCP for further assistance, discussed reasons to return to the ED.                       Clinical Impression:     1. Accidental overdose of heroin, initial encounter    2. Altered awareness, transient          Disposition:   Disposition: Discharged  Condition: Stable    Scribe attestation: I, Dr. Jama Suárez, personally performed the services described in this documentation. All medical record entries made by the scribe were at my direction and in my presence.  I have reviewed the chart and agree that the record reflects my personal performance and is accurate and complete. Jama Suárez MD.  1:30 PM 04/15/2018                        Jama Suárez MD  04/15/18 0479

## 2018-07-23 ENCOUNTER — HOSPITAL ENCOUNTER (EMERGENCY)
Facility: HOSPITAL | Age: 28
Discharge: HOME | End: 2018-07-23
Attending: EMERGENCY MEDICINE
Payer: MEDICAID

## 2018-07-23 VITALS
BODY MASS INDEX: 25.9 KG/M2 | RESPIRATION RATE: 20 BRPM | HEIGHT: 71 IN | HEART RATE: 86 BPM | OXYGEN SATURATION: 99 % | WEIGHT: 185 LBS | SYSTOLIC BLOOD PRESSURE: 135 MMHG | TEMPERATURE: 98 F | DIASTOLIC BLOOD PRESSURE: 77 MMHG

## 2018-07-23 DIAGNOSIS — S92.525A CLOSED NONDISPLACED FRACTURE OF MIDDLE PHALANX OF LESSER TOE OF LEFT FOOT, INITIAL ENCOUNTER: Primary | ICD-10-CM

## 2018-07-23 PROCEDURE — 99283 EMERGENCY DEPT VISIT LOW MDM: CPT

## 2018-07-23 PROCEDURE — 99283 EMERGENCY DEPT VISIT LOW MDM: CPT | Mod: ,,, | Performed by: EMERGENCY MEDICINE

## 2018-07-23 NOTE — ED PROVIDER NOTES
Encounter Date: 7/23/2018    SCRIBE #1 NOTE: I, Lynette Humphrey, am scribing for, and in the presence of,  Dr. Scales. I have scribed the entire note.       History     Chief Complaint   Patient presents with    Foot Injury     Pt states he injuried 5th toe on left foot 3 weeks ago and not getting better. Pt states pain upon walking and swelling.     Time patient was seen by the provider: 3:17 AM      The patient is a 27 y.o. male who presents to the ED with a complaint of 5th toe swelling and pain on left foot for 3 weeks. The patient reports he hit his toe on the side of his entertainment center. He states it is painful when walking. Patient has no further complaints.          The history is provided by the patient and medical records.     Review of patient's allergies indicates:  No Known Allergies  Past Medical History:   Diagnosis Date    Drug abuse      Past Surgical History:   Procedure Laterality Date    KNEE SURGERY       No family history on file.  Social History   Substance Use Topics    Smoking status: Current Every Day Smoker     Types: Cigarettes    Smokeless tobacco: Never Used    Alcohol use No     Review of Systems   Musculoskeletal:        (+) 5th toe of left foot swelling and pain   All other systems reviewed and are negative.      Physical Exam     Initial Vitals [07/23/18 0308]   BP Pulse Resp Temp SpO2   135/77 86 20 97.8 °F (36.6 °C) 99 %      MAP       --         Physical Exam    Nursing note and vitals reviewed.  Constitutional: He appears well-developed and well-nourished. He is not diaphoretic. No distress.   HENT:   Head: Normocephalic and atraumatic.   Musculoskeletal: Normal range of motion.   Tenderness at mid part of 5th left toe. Mildly red, swelling, no warmth. Well perfused, normal appearing.   Neurological: He is alert and oriented to person, place, and time. He has normal strength.   Skin: Skin is warm and dry. No rash noted.   Psychiatric: He has a normal mood and affect.  His behavior is normal. Thought content normal.         ED Course   Procedures  Labs Reviewed - No data to display       Imaging Results          X-Ray Toe 2 or More Views Left (Final result)  Result time 07/23/18 05:19:10    Final result by Danielle Sandoval MD (07/23/18 05:19:10)                 Impression:      Minimally displaced fracture of the distal phalanx of the 5th toe with adjacent soft tissue swelling.      Electronically signed by: Danielle Sandoval MD  Date:    07/23/2018  Time:    05:19             Narrative:    EXAMINATION:  XR TOE 2 OR MORE VIEWS LEFT    CLINICAL HISTORY:  5th toe;    TECHNIQUE:  Three views of the left toes were performed    COMPARISON:  None.    FINDINGS:  There is a minimally displaced fracture of the distal phalanx of the 5th toe with adjacent overlying soft tissue swelling.  No intra-articular extension or retained radiopaque foreign body.                                 Medical Decision Making:   Clinical Tests:   Radiological Study: Ordered and Reviewed  ED Management:  Toe fx without displacement.  To tape   Motrin  Podiatry if not better in the next month            Scribe Attestation:   Scribe #1: I performed the above scribed service and the documentation accurately describes the services I performed. I attest to the accuracy of the note.    I, Dr. Radha Scales, personally performed the services described in this documentation. All medical record entries made by the scribe were at my direction and in my presence.  I have reviewed the chart and agree that the record reflects my personal performance and is accurate and complete. Radha Scales MD.  5:07 AM 07/23/2018           Clinical Impression:   The encounter diagnosis was Closed nondisplaced fracture of middle phalanx of lesser toe of left foot, initial encounter.                             Radha Scales MD  07/23/18 0527       Radha Scales MD  07/23/18 0532

## 2018-07-23 NOTE — ED TRIAGE NOTES
"Pt reports playing with his dog about three weeks ago and "clipped" his L pinky toe a dresser. Reports putting ice on the toe for a few days but states swelling hasn't come down. Some swelling noted to toe and pt reports pain.     Patient Identifiers for Nagi Castaneda checked and correct  LOC: The patient is awake, alert and aware of environment with an appropriate affect, the patient is oriented x 3 and speaking appropriate.  APPEARANCE: Patient resting comfortably and in no acute distress, patient is clean and well groomed, patient's clothing is properly fastened.  SKIN: The skin is warm and dry, patient has normal skin turgor and moist mucus membranes,no rashes or lesions.Skin Intact , No Breakdown Noted  Musculoskeletal :  Normal range of motion noted. Moves all extremeties well, swelling and tenderness to L pinky toe, but pt able to walk on it to exam room.  RESPIRATORY: Airway is open and patent, respirations are spontaneous, patient has a normal effort and rate.  CARDIAC: Patient has a normal rate and rhythm, no periphreal edema noted, capillary refill < 3 seconds.   ABDOMEN: Soft and non tender to palpation, no distention noted.  PULSES: 2+  And symmetrical in all extremeties  NEUROLOGIC: PERRL. facial expression is symmetrical, patient moving all extremities, normal sensation in all extremities when touched with a finger.The patient is awake, alert and cooperative with a calm affect, patient is aware of environment.    Will continue to monitor    "

## 2018-08-07 ENCOUNTER — HOSPITAL ENCOUNTER (EMERGENCY)
Facility: HOSPITAL | Age: 28
Discharge: HOME | End: 2018-08-07
Payer: MEDICAID

## 2018-08-07 VITALS
DIASTOLIC BLOOD PRESSURE: 62 MMHG | HEART RATE: 82 BPM | TEMPERATURE: 98 F | BODY MASS INDEX: 25.6 KG/M2 | WEIGHT: 189 LBS | RESPIRATION RATE: 13 BRPM | HEIGHT: 72 IN | SYSTOLIC BLOOD PRESSURE: 125 MMHG | OXYGEN SATURATION: 97 %

## 2018-08-07 DIAGNOSIS — R52 PAIN: ICD-10-CM

## 2018-08-07 DIAGNOSIS — S52.221A CLOSED DISPLACED TRANSVERSE FRACTURE OF SHAFT OF RIGHT ULNA, INITIAL ENCOUNTER: Primary | ICD-10-CM

## 2018-08-07 PROCEDURE — 99284 EMERGENCY DEPT VISIT MOD MDM: CPT | Mod: ,,,

## 2018-08-07 PROCEDURE — 96372 THER/PROPH/DIAG INJ SC/IM: CPT

## 2018-08-07 PROCEDURE — 63600175 PHARM REV CODE 636 W HCPCS: Performed by: STUDENT IN AN ORGANIZED HEALTH CARE EDUCATION/TRAINING PROGRAM

## 2018-08-07 PROCEDURE — 99283 EMERGENCY DEPT VISIT LOW MDM: CPT | Mod: 25

## 2018-08-07 RX ORDER — KETOROLAC TROMETHAMINE 30 MG/ML
15 INJECTION, SOLUTION INTRAMUSCULAR; INTRAVENOUS
Status: COMPLETED | OUTPATIENT
Start: 2018-08-07 | End: 2018-08-07

## 2018-08-07 RX ADMIN — KETOROLAC TROMETHAMINE 15 MG: 30 INJECTION INTRAMUSCULAR; INTRAVENOUS at 06:08
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
## 2018-08-08 ENCOUNTER — TELEPHONE (OUTPATIENT)
Dept: ORTHOPEDICS | Facility: CLINIC | Age: 28
End: 2018-08-08
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
## 2018-08-08 NOTE — ED NOTES
"Xray stating CD is ready, but before it is released there is a form that must be signed and sent back to 2nd floor xray. Patient opting not to wait as he states "I don't want my ride to leave out front."  2nd floor xray states patient can receive CD tomorrow on 2nd floor radiology clinic. Patient informed and states he will return tomorrow for CD.   "

## 2019-05-04 ENCOUNTER — HOSPITAL ENCOUNTER (EMERGENCY)
Facility: HOSPITAL | Age: 29
Discharge: HOME | End: 2019-05-04
Attending: EMERGENCY MEDICINE
Payer: MEDICAID

## 2019-05-04 VITALS
HEIGHT: 72 IN | OXYGEN SATURATION: 97 % | BODY MASS INDEX: 28.44 KG/M2 | SYSTOLIC BLOOD PRESSURE: 145 MMHG | TEMPERATURE: 98 F | WEIGHT: 210 LBS | HEART RATE: 80 BPM | DIASTOLIC BLOOD PRESSURE: 74 MMHG | RESPIRATION RATE: 20 BRPM

## 2019-05-04 DIAGNOSIS — L03.115 CELLULITIS OF RIGHT LOWER EXTREMITY: Primary | ICD-10-CM

## 2019-05-04 DIAGNOSIS — T07.XXXA MULTIPLE OPEN WOUNDS: ICD-10-CM

## 2019-05-04 PROCEDURE — 99283 EMERGENCY DEPT VISIT LOW MDM: CPT

## 2019-05-04 RX ORDER — CEPHALEXIN 500 MG/1
500 CAPSULE ORAL 4 TIMES DAILY
Qty: 20 CAPSULE | Refills: 0 | Status: SHIPPED | OUTPATIENT
Start: 2019-05-04 | End: 2019-05-09

## 2019-05-05 NOTE — ED PROVIDER NOTES
Encounter Date: 5/4/2019    SCRIBE #1 NOTE: I, Della Arias, am scribing for, and in the presence of,  Dr. East. I have scribed the entire note.       History     Chief Complaint   Patient presents with    Skin Ulcer     Pt. c/o having abrasions to arms, back and shoulder that began yesterday. Pt. reports having abcess to the left groin in the past and has picked them and drained at home.      Nagi Castaneda is a 28 y.o. male who  has a past medical history of Drug abuse.    The patient presents to the ED due to skin lesions. He reports onset of symptoms was yesterday. 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 When notes when he woke the next morning he had several bumps that were itching on the legs, arms and back. The patient admits he has been scratching the areas. He denies any associated fever, chills, nausea or vomiting. The patient also admits to using Meth recently.    The history is provided by the patient.     Review of patient's allergies indicates:  No Known Allergies  Past Medical History:   Diagnosis Date    Drug abuse      Past Surgical History:   Procedure Laterality Date    KNEE SURGERY       History reviewed. No pertinent family history.  Social History     Tobacco Use    Smoking status: Current Every Day Smoker     Packs/day: 1.00     Types: Cigarettes    Smokeless tobacco: Never Used   Substance Use Topics    Alcohol use: No    Drug use: Yes     Frequency: 1.0 times per week     Types: Marijuana, Cocaine     Review of Systems   Constitutional: Negative for chills and fever.   HENT: Negative for congestion, ear pain, rhinorrhea and sore throat.    Respiratory: Negative for cough, shortness of breath and wheezing.    Cardiovascular: Negative for chest pain and palpitations.   Gastrointestinal: Negative for abdominal pain, diarrhea, nausea and vomiting.   Genitourinary: Negative for dysuria and hematuria.   Musculoskeletal: Negative for back pain, myalgias  and neck pain.   Skin: Negative for rash.        Lesions to bilateral arms and legs   Neurological: Negative for dizziness, weakness, light-headedness and headaches.   Psychiatric/Behavioral: Negative for confusion.       Physical Exam     Initial Vitals [05/04/19 2127]   BP Pulse Resp Temp SpO2   (!) 150/76 108 18 98.5 °F (36.9 °C) 97 %      MAP       --         Physical Exam    Nursing note and vitals reviewed.  Constitutional: He appears well-developed and well-nourished. He is not diaphoretic. No distress.   HENT:   Head: Normocephalic and atraumatic.   Mouth/Throat: Oropharynx is clear and moist.   Eyes: Conjunctivae and EOM are normal.   Neck: Normal range of motion. Neck supple.   Cardiovascular: Intact distal pulses.   Pulmonary/Chest: No respiratory distress.   Musculoskeletal: Normal range of motion. He exhibits no edema or tenderness.   Lymphadenopathy:     He has no cervical adenopathy.   Neurological: He is alert and oriented to person, place, and time. He has normal strength.   Skin: Skin is warm and dry. There is erythema.   Multiple crusted over lesions to arms, legs and face. Consistent with crystal meth use. Multiple linear abrasions to right medial lower leg that are crusted and surrounding erythema         ED Course   Procedures  Labs Reviewed - No data to display       Imaging Results    None                               Clinical Impression:     1. Cellulitis of right lower extremity    2. Multiple open wounds         I, Mary East, personally performed the services described in this documentation. All medical record entries made by the scribe were at my direction and in my presence.  I have reviewed the chart and agree that the record reflects my personal performance and is accurate and complete. Mary East M.D. 10:48 PM05/04/2019                   Mary East MD  05/04/19 [id number]

## 2019-05-05 NOTE — ED TRIAGE NOTES
Present to ED with complaint of bumps and rash after scraping leg on blackberry bush. Several open areas noted to body. Report started as bumps then started picking them. Report itching and scratching.

## 2020-03-08 ENCOUNTER — HOSPITAL ENCOUNTER (EMERGENCY)
Facility: HOSPITAL | Age: 30
Discharge: HOME | End: 2020-03-08
Attending: EMERGENCY MEDICINE
Payer: MEDICAID

## 2020-03-08 VITALS
DIASTOLIC BLOOD PRESSURE: 83 MMHG | RESPIRATION RATE: 20 BRPM | OXYGEN SATURATION: 98 % | HEART RATE: 114 BPM | SYSTOLIC BLOOD PRESSURE: 116 MMHG

## 2020-03-08 DIAGNOSIS — Z72.0 TOBACCO USE: ICD-10-CM

## 2020-03-08 DIAGNOSIS — S89.92XA KNEE INJURY, LEFT, INITIAL ENCOUNTER: Primary | ICD-10-CM
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
## 2020-03-08 PROCEDURE — 29505 APPLICATION LONG LEG SPLINT: CPT | Mod: LT

## 2020-03-08 PROCEDURE — 99284 PR EMERGENCY DEPT VISIT,LEVEL IV: ICD-10-PCS | Mod: ,,, | Performed by: PHYSICIAN ASSISTANT

## 2020-03-08 PROCEDURE — 99284 EMERGENCY DEPT VISIT MOD MDM: CPT | Mod: ,,, | Performed by: PHYSICIAN ASSISTANT

## 2020-03-08 PROCEDURE — 25000003 PHARM REV CODE 250: Performed by: PHYSICIAN ASSISTANT

## 2020-03-08 PROCEDURE — 99283 EMERGENCY DEPT VISIT LOW MDM: CPT | Mod: 25

## 2020-03-08 RX ORDER — KETOROLAC TROMETHAMINE 10 MG/1
10 TABLET, FILM COATED ORAL
Status: COMPLETED | OUTPATIENT
Start: 2020-03-08 | End: 2020-03-08

## 2020-03-08 RX ORDER — NAPROXEN 500 MG/1
500 TABLET ORAL EVERY 12 HOURS PRN
Qty: 14 TABLET | Refills: 0 | Status: SHIPPED | OUTPATIENT
Start: 2020-03-08 | End: 2020-03-15

## 2020-03-08 RX ADMIN — KETOROLAC TROMETHAMINE 10 MG: 10 TABLET, FILM COATED ORAL at 12:03

## 2020-03-08 NOTE — ED TRIAGE NOTES
Nagi Castaneda, an 29 y.o. male presents to the ED with an injury to his left knee after falling while playing a game of basketball.  Patient states that it feels like he did when he tore his meniscus on the right side.  Reports swelling and pain to the left patella and states that he heard several pops when his knee landed on someone's foot.      Review of patient's allergies indicates:  No Known Allergies  Chief Complaint   Patient presents with    Knee Pain     patient was playing basketball and fell, injuring his left knee.  He said he heard several pops when he fell; unable to bear weight at this time     Past Medical History:   Diagnosis Date    Drug abuse

## 2020-03-08 NOTE — ED NOTES
Knee immobilizer applied to affected extremity. Confirms comfort. Crutch training provided, return demonstration performed.

## 2020-09-10 ENCOUNTER — HOSPITAL ENCOUNTER (INPATIENT)
Facility: HOSPITAL | Age: 30
LOS: 5 days | Discharge: HOME | DRG: 885 | End: 2020-09-15
Attending: PSYCHIATRY & NEUROLOGY | Admitting: PSYCHIATRY & NEUROLOGY
Payer: MEDICAID

## 2020-09-10 ENCOUNTER — HOSPITAL ENCOUNTER (EMERGENCY)
Facility: HOSPITAL | Age: 30
Discharge: PSYCHIATRIC OR SUBSTANCE USE FACILITY | End: 2020-09-10
Attending: EMERGENCY MEDICINE
Payer: MEDICAID

## 2020-09-10 VITALS
HEIGHT: 72 IN | HEART RATE: 86 BPM | DIASTOLIC BLOOD PRESSURE: 64 MMHG | RESPIRATION RATE: 18 BRPM | BODY MASS INDEX: 28.44 KG/M2 | TEMPERATURE: 99 F | WEIGHT: 210 LBS | OXYGEN SATURATION: 97 % | SYSTOLIC BLOOD PRESSURE: 112 MMHG

## 2020-09-10 DIAGNOSIS — R45.850 HOMICIDAL IDEATION: Primary | ICD-10-CM

## 2020-09-10 DIAGNOSIS — F33.3 DEPRESSION, MAJOR, RECURRENT, SEVERE WITH PSYCHOSIS: ICD-10-CM

## 2020-09-10 DIAGNOSIS — Z00.8 MEDICAL CLEARANCE FOR PSYCHIATRIC ADMISSION: ICD-10-CM

## 2020-09-10 LAB
ALBUMIN SERPL BCP-MCNC: 4.3 G/DL (ref 3.5–5.2)
ALP SERPL-CCNC: 62 U/L (ref 55–135)
ALT SERPL W/O P-5'-P-CCNC: 25 U/L (ref 10–44)
AMPHET+METHAMPHET UR QL: NORMAL
ANION GAP SERPL CALC-SCNC: 4 MMOL/L (ref 8–16)
APAP SERPL-MCNC: <3 UG/ML (ref 10–20)
AST SERPL-CCNC: 22 U/L (ref 10–40)
BACTERIA #/AREA URNS HPF: ABNORMAL /HPF
BARBITURATES UR QL SCN>200 NG/ML: NEGATIVE
BASOPHILS # BLD AUTO: 0.03 K/UL (ref 0–0.2)
BASOPHILS NFR BLD: 0.6 % (ref 0–1.9)
BENZODIAZ UR QL SCN>200 NG/ML: NEGATIVE
BILIRUB SERPL-MCNC: 0.5 MG/DL (ref 0.1–1)
BILIRUB UR QL STRIP: NEGATIVE
BUN SERPL-MCNC: 10 MG/DL (ref 6–20)
BZE UR QL SCN: NEGATIVE
CALCIUM SERPL-MCNC: 8.7 MG/DL (ref 8.7–10.5)
CANNABINOIDS UR QL SCN: NEGATIVE
CHLORIDE SERPL-SCNC: 105 MMOL/L (ref 95–110)
CLARITY UR: CLEAR
CO2 SERPL-SCNC: 29 MMOL/L (ref 23–29)
COLOR UR: YELLOW
CREAT SERPL-MCNC: 0.9 MG/DL (ref 0.5–1.4)
CREAT UR-MCNC: 251.8 MG/DL (ref 23–375)
DIFFERENTIAL METHOD: ABNORMAL
EOSINOPHIL # BLD AUTO: 0.2 K/UL (ref 0–0.5)
EOSINOPHIL NFR BLD: 4.7 % (ref 0–8)
ERYTHROCYTE [DISTWIDTH] IN BLOOD BY AUTOMATED COUNT: 11.9 % (ref 11.5–14.5)
EST. GFR  (AFRICAN AMERICAN): >60 ML/MIN/1.73 M^2
EST. GFR  (NON AFRICAN AMERICAN): >60 ML/MIN/1.73 M^2
ETHANOL SERPL-MCNC: <10 MG/DL
GLUCOSE SERPL-MCNC: 99 MG/DL (ref 70–110)
GLUCOSE UR QL STRIP: NEGATIVE
GRAN CASTS #/AREA URNS LPF: 2 /LPF
HCT VFR BLD AUTO: 43.2 % (ref 40–54)
HGB BLD-MCNC: 14.7 G/DL (ref 14–18)
HGB UR QL STRIP: NEGATIVE
HYALINE CASTS #/AREA URNS LPF: 0 /LPF
IMM GRANULOCYTES # BLD AUTO: 0.01 K/UL (ref 0–0.04)
IMM GRANULOCYTES NFR BLD AUTO: 0.2 % (ref 0–0.5)
KETONES UR QL STRIP: NEGATIVE
LEUKOCYTE ESTERASE UR QL STRIP: NEGATIVE
LYMPHOCYTES # BLD AUTO: 1.3 K/UL (ref 1–4.8)
LYMPHOCYTES NFR BLD: 25.1 % (ref 18–48)
MCH RBC QN AUTO: 31.4 PG (ref 27–31)
MCHC RBC AUTO-ENTMCNC: 34 G/DL (ref 32–36)
MCV RBC AUTO: 92 FL (ref 82–98)
METHADONE UR QL SCN>300 NG/ML: NEGATIVE
MICROSCOPIC COMMENT: ABNORMAL
MONOCYTES # BLD AUTO: 0.4 K/UL (ref 0.3–1)
MONOCYTES NFR BLD: 7.5 % (ref 4–15)
NEUTROPHILS # BLD AUTO: 3.2 K/UL (ref 1.8–7.7)
NEUTROPHILS NFR BLD: 61.9 % (ref 38–73)
NITRITE UR QL STRIP: NEGATIVE
NRBC BLD-RTO: 0 /100 WBC
OPIATES UR QL SCN: NEGATIVE
PCP UR QL SCN>25 NG/ML: NEGATIVE
PH UR STRIP: 6 [PH] (ref 5–8)
PLATELET # BLD AUTO: 231 K/UL (ref 150–350)
PMV BLD AUTO: 9.2 FL (ref 9.2–12.9)
POTASSIUM SERPL-SCNC: 3.8 MMOL/L (ref 3.5–5.1)
PROT SERPL-MCNC: 7.2 G/DL (ref 6–8.4)
PROT UR QL STRIP: ABNORMAL
RBC # BLD AUTO: 4.68 M/UL (ref 4.6–6.2)
RBC #/AREA URNS HPF: 1 /HPF (ref 0–4)
SALICYLATES SERPL-MCNC: <5 MG/DL (ref 15–30)
SARS-COV-2 RDRP RESP QL NAA+PROBE: NEGATIVE
SODIUM SERPL-SCNC: 138 MMOL/L (ref 136–145)
SP GR UR STRIP: >=1.03 (ref 1–1.03)
TOXICOLOGY INFORMATION: NORMAL
TSH SERPL DL<=0.005 MIU/L-ACNC: 0.42 UIU/ML (ref 0.4–4)
URN SPEC COLLECT METH UR: ABNORMAL
UROBILINOGEN UR STRIP-ACNC: NEGATIVE EU/DL
WBC # BLD AUTO: 5.1 K/UL (ref 3.9–12.7)
WBC #/AREA URNS HPF: 3 /HPF (ref 0–5)

## 2020-09-10 PROCEDURE — 11400000 HC PSYCH PRIVATE ROOM

## 2020-09-10 PROCEDURE — 80307 DRUG TEST PRSMV CHEM ANLYZR: CPT

## 2020-09-10 PROCEDURE — 99215 PR OFFICE/OUTPT VISIT, EST, LEVL V, 40-54 MIN: ICD-10-PCS | Mod: AF,HB,S$PBB, | Performed by: PSYCHIATRY & NEUROLOGY

## 2020-09-10 PROCEDURE — 85025 COMPLETE CBC W/AUTO DIFF WBC: CPT

## 2020-09-10 PROCEDURE — 84443 ASSAY THYROID STIM HORMONE: CPT

## 2020-09-10 PROCEDURE — 80329 ANALGESICS NON-OPIOID 1 OR 2: CPT

## 2020-09-10 PROCEDURE — 90833 PSYTX W PT W E/M 30 MIN: CPT | Mod: AF,HB,S$PBB, | Performed by: PSYCHIATRY & NEUROLOGY

## 2020-09-10 PROCEDURE — 80053 COMPREHEN METABOLIC PANEL: CPT

## 2020-09-10 PROCEDURE — 90833 PR PSYCHOTHERAPY W/PATIENT W/E&M, 30 MIN (ADD ON): ICD-10-PCS | Mod: AF,HB,S$PBB, | Performed by: PSYCHIATRY & NEUROLOGY

## 2020-09-10 PROCEDURE — 99215 OFFICE O/P EST HI 40 MIN: CPT | Mod: AF,HB,S$PBB, | Performed by: PSYCHIATRY & NEUROLOGY

## 2020-09-10 PROCEDURE — 25000003 PHARM REV CODE 250: Performed by: PSYCHIATRY & NEUROLOGY

## 2020-09-10 PROCEDURE — 80320 DRUG SCREEN QUANTALCOHOLS: CPT

## 2020-09-10 PROCEDURE — 81000 URINALYSIS NONAUTO W/SCOPE: CPT | Mod: 59

## 2020-09-10 PROCEDURE — 99285 EMERGENCY DEPT VISIT HI MDM: CPT

## 2020-09-10 PROCEDURE — U0002 COVID-19 LAB TEST NON-CDC: HCPCS

## 2020-09-10 RX ORDER — RISPERIDONE 1 MG/1
1 TABLET ORAL 2 TIMES DAILY
Status: DISCONTINUED | OUTPATIENT
Start: 2020-09-10 | End: 2020-09-12

## 2020-09-10 RX ADMIN — RISPERIDONE 1 MG: 1 TABLET ORAL at 08:09

## 2020-09-10 NOTE — CONSULTS
"PSYCHIATRY ED CONSULT NOTE      9/10/2020 10:43 AM   Nagi Castaneda   1990   [id number]           DATE OF ADMISSION: 9/10/2020  9:14 AM    SITE: [facility]    CURRENT LEGAL STATUS: Patient currently meets PEC criteria due to currently being an imminent threat to self/others 2/2 mental illness at this time.       HISTORY    Per ED MD:  Chief Complaint   Patient presents with    altercation       pt. reports getting into a verbal arguement with his mother this morning and made a threat to hurt himself. the pt. states he was only trying to make her mad and denies si.    This is a 30 year-old WM who has a past medical history of Asthma and Drug abuse, presents via EMS for mental health evaluation. Patient was involved in a verbal argument with his mother and brother's girlfriend this morning. Patient reports he has been stressed with work and is expected to clean and take care of all the work at home. Patient says that he told his family the stress is going to kill him and not that he wanted to kill himself. The police were called who wanted him to be evaluated.   On arrival to the ER he denies any SI, HI, or AVH. He states he told his family that the stress they were causing him would kill him and not that he wanted to kill himself. Patient does report a history of drug problems years ago and adds that he had prior history of SI/HI while he was abusing drugs. States he has been drug free for a while now but does smoke marijuana "once in a blue moon with friends." He denies any history of bipolar disorder or schizophrenia. Denies history of psychiatric hospitalization. He denies any current SI/HI or AVH. He is a smoker but denies recent alcohol intake or drug use. Patient works in [employer] and states he is supposed to be going to work this morning.      Chief Complaint / Reason for Psychiatry Consult: SI/HI      HPI   Nagi Castaneda is a 30 y.o. male with a past medical history of asthma and a past psychiatric " "history of depression, anxiety, and polysubstance abuse, currently in the [facility] ED via EMS for a psychiatric evaluation related to SI/HI as noted above.  Psychiatry was originally consulted as noted above.  The patient was seen and examined.  The chart was reviewed.  On examination today, the patient was cooperative but appears tense with rambling/pressured speech and tangential/circumferential thought process requiring redirection to the questions being asked.  He frequently attempted to minimize the events leading to this ED presentation and made contradictory statements (when compared to chart review and collateral information).  While he does endorse "saying a few things that I didn't mean," he denies ever threatening his family as noted in the collateral info below.  After significant counseling and psychotherapy implementation, he does endorse recent intermittent low mood, intermittent anxiety, and poor sleep (about 4 hours per night) (as noted below in psych ROS) related to family, financial, and work stressors.  He endorses a history of depression, anxiety, and polysubstance abuse, but he was unwilling to elaborate significantly on the details of this.  He is now denying any passive/active SI/HI.  He denies currently being on any psychiatric medications.  He endorses an OK appetite.  He initially told the ED MD that he had been sober for 1+ years other than intermittent cannabis abuse, but he later told me that he has been using (as recently as within the last 24 hours) non-prescribed adderall and rx opiates that he has been buying from other people.  The patient has a notable history of IV heroin use requiring inpatient rehab previously.  Regarding current medical/physical complaints, he endorses chronic low back pain.  He denies any other medical complaints at this time.  NAD was observed during the examination.      Collateral:    ED MD spoke to the patient's mother and patient's brother's girlfriend " (both individuals live in the same home with the patient) over the telephone who stated that the patient threatened to decapitate them and kill himself, and kill the police if they were called. They feel he is a danger to himself/others and is afraid what will happen if he comes home. Mother says the patient is very manipulative. Of note the patient sent a text message to his mother, girlfriend, and brother, threatening them and describing what he was going to do to them involving the decapitation.      Psychiatric Review Of Systems - Currently, the patient is endorsing and/or denying the following:    Endorses intermittent symptoms of depression such as diminished mood & irritability    Endorses difficulty with sleep initiation & maintenance    Denies Suicidal/Homicidal ideations: active/passive ideations, organized plans, future intentions    Denies Symptoms of psychosis: hallucinations, delusions, disorganized thinking, disorganized behavior or abnormal motor behavior, or negative symptoms (diminshed emotional expression, avolition, anhedonia, alogia, asociality     Denies Symptoms of merary or hypomania: elevated, expansive, or irritable mood with increased energy or activity; with inflated self-esteem or grandiosity, decreased need for sleep, increased rate of speech, FOI or racing thoughts, distractibility, increased goal directed activity or PMA, risky/disinhibited behavior (see HPI above for objective signs of merary)    Endorses intermittent symptoms of anxiety such as excessive worry/fear, more days than not, about numerous issues, difficult to control, with restlessness, fatigue, poor concentration, irritability, muscle tension, sleep disturbance; causes functionally impairing distress     Denies Symptoms of Panic Disorder: recurrent panic attacks, precipitated or un-precipitated, source of worry and/or behavioral changes secondary; with or without agoraphobia    Denies Symptoms of PTSD: h/o trauma;  re-experiencing/intrusive symptoms, avoidant behavior, negative alterations in cognition or mood, or hyperarousal symptoms; with or without dissociative symptoms     Denies Symptoms of OCD: obsessions or compulsions     Denies Symptoms of Eating Disorders: anorexia, bulimia or binging    Endorses Substance Use/Abuse (Adderall and RX opiates w/ hx of IV heroin): patient unwilling to discuss the details such as - intoxication, withdrawal, tolerance, used in larger amounts or duration than intended, unsuccessful attempts to limit or quit, increased time engaging in or seeking out, cravings or strong desire to use, failure to fulfill obligations, negative consequences in social/interpersonal/occupational,/recreational areas, use in dangerous situations, medical or psychological consequences       PSYCHOTHERAPY ADD-ON +[id number]   30 (16-37*) minutes    Time: 16 minutes  Participants: Met with patient    Therapeutic Intervention Type: behavior modifying psychotherapy, supportive psychotherapy  Why chosen therapy is appropriate versus another modality: relevant to diagnosis, patient responds to this modality, evidence based practice    Target symptoms: depression, anxiety , substance abuse, mood disorder, work stress  Primary focus: anxiety, mood, and substance abuse   Psychotherapeutic techniques: supportive and psychodynamic techniques; psycho-education; deep breathing exercises; meditation; motivational interviewing; CBT; problem solving techniques and managing life stressors    Outcome monitoring methods: self-report, observation    Patient's response to intervention:  The patient's response to intervention is accepting, guarded.    Progress toward goals:  The patient's progress toward goals is fair , limited.      ROS  General ROS: negative for - chills, fatigue, fever or night sweats  Ophthalmic ROS: negative for - blurry vision, double vision or eye pain  ENT ROS: negative for - sinus pain, headaches, sore throat or  "visual changes  Allergy and Immunology ROS: negative for - hives, itchy/watery eyes or nasal congestion  Hematological and Lymphatic ROS: negative for - bleeding problems, bruising, jaundice or pallor  Endocrine ROS: negative for - galactorrhea, hot flashes, mood swings, palpitations or temperature intolerance  Respiratory ROS: negative for - cough, hemoptysis, shortness of breath, tachypnea or wheezing  Cardiovascular ROS: negative for - chest pain, dyspnea on exertion, loss of consciousness, palpitations, rapid heart rate or shortness of breath  Gastrointestinal ROS: negative for - appetite loss, nausea, abdominal pain, blood in stools, change in bowel habits, constipation or diarrhea  Genito-Urinary ROS: negative for - incontinence, nocturia or pelvic pain  Musculoskeletal ROS: negative for - joint stiffness, joint swelling, or muscle pain ; positive for chronic low back pain   Neurological ROS: negative for - behavioral changes, confusion, dizziness, memory loss, numbness/tingling or seizures  Dermatological ROS: negative for dry skin, hair changes, pruritus or rash  Psychiatric ROS: see detailed psychiatric ROS above in history section       Past Psychiatric History:  Previous Medication Trials: yes ; "some depression med that I can't remember"  Previous Psychiatric Hospitalizations: denies   Previous Suicide Attempts: denies   History of Violence: denies  Outpatient Psychiatrist: denies    Social History:  Marital Status: single  Children: 0   Employment Status/Info: currently employed  Education: high school diploma/GED  Special Ed: denies  : denies  Protestant: Baptism   Housing Status: lives with mother, brother, and brother's GF at home in [place], [place]  Hobbies/Leisure time: "building stuff"  History of phys/sexual abuse: denies  Access to gun: denies    Family Psychiatric History: denies    Substance Abuse History:  Recreational Drugs: hx of Opiate Use Disorder with IV heroin use; denies heroin use " in over 1 year; endorses recent adderall and rx opiate abuse (unable to quantify recent use)  Use of Alcohol: denies  Rehab History:yes  Tobacco Use:yes, 1 PPD x 3 years   Use of Caffeine: yes, 2 red bulls every other day   Use of OTC: Ibuprofen PRN   Legal consequences of chemical use: denies    Legal History:  Past Charges/Incarcerations:yes, for theft   Pending charges: denies     Psychosocial Stressors: family, financial, legal, occupational and substance abuse.   Functioning Relationships: good support system  Strengths AND Liabilities  Strength: Patient is expressive/articulate., Strength: Patient is intelligent., Strength: Patient has positive support network., Liability: Patient is defensive., Liability: Patient has poor judgment, Liability: Patient lacks coping skills.      PAST MEDICAL & SURGICAL HISTORY   Past Medical History:   Diagnosis Date    Asthma     Drug abuse      Past Surgical History:   Procedure Laterality Date    ANTERIOR CRUCIATE LIGAMENT REPAIR Right        NEUROLOGIC HISTORY  Seizures: denies   Head trauma: denies     FAMILY HISTORY   No family history on file.    ALLERGIES   Review of patient's allergies indicates:  No Known Allergies    CURRENT MEDICATION REGIMEN   Home Meds:   Prior to Admission medications    Medication Sig Start Date End Date Taking? Authorizing Provider   albuterol 90 mcg/actuation inhaler Inhale 1-2 puffs into the lungs every 6 (six) hours as needed for Wheezing or Shortness of Breath. Rescue 3/23/17 3/23/18  Casandra Samaniego PA-C       OTC Meds: Ibuprofen PRN    Scheduled Meds:    PRN Meds:    Psychotherapeutics (From admission, onward)    None          LABORATORY DATA   Recent Results (from the past 72 hour(s))   CBC auto differential    Collection Time: 09/10/20 10:22 AM   Result Value Ref Range    WBC 5.10 3.90 - 12.70 K/uL    RBC 4.68 4.60 - 6.20 M/uL    Hemoglobin 14.7 14.0 - 18.0 g/dL    Hematocrit 43.2 40.0 - 54.0 %    Mean Corpuscular Volume 92 82 - 98  "fL    Mean Corpuscular Hemoglobin 31.4 (H) 27.0 - 31.0 pg    Mean Corpuscular Hemoglobin Conc 34.0 32.0 - 36.0 g/dL    RDW 11.9 11.5 - 14.5 %    Platelets 231 150 - 350 K/uL    MPV 9.2 9.2 - 12.9 fL    Immature Granulocytes 0.2 0.0 - 0.5 %    Gran # (ANC) 3.2 1.8 - 7.7 K/uL    Immature Grans (Abs) 0.01 0.00 - 0.04 K/uL    Lymph # 1.3 1.0 - 4.8 K/uL    Mono # 0.4 0.3 - 1.0 K/uL    Eos # 0.2 0.0 - 0.5 K/uL    Baso # 0.03 0.00 - 0.20 K/uL    nRBC 0 0 /100 WBC    Gran% 61.9 38.0 - 73.0 %    Lymph% 25.1 18.0 - 48.0 %    Mono% 7.5 4.0 - 15.0 %    Eosinophil% 4.7 0.0 - 8.0 %    Basophil% 0.6 0.0 - 1.9 %    Differential Method Automated       No results found for: PHENYTOIN, PHENOBARB, VALPROATE, CBMZ      EXAMINATION    VITALS   Vitals:    09/10/20 0912   BP: 115/81   BP Location: Left arm   Patient Position: Sitting   Pulse: 96   Resp: 18   Temp: 97.9 °F (36.6 °C)   TempSrc: Oral   SpO2: 96%   Weight: 95.3 kg (210 lb)   Height: 6' (1.829 m)        CONSTITUTIONAL  General Appearance: NAD, unremarkable, age appropriate, normal weight, lying in bed    MUSCULOSKELETAL  Muscle Strength and Tone: WNL    Abnormal Involuntary Movements: none observed   Gait and Station: WNL; non-ataxic     PSYCHIATRIC   Behavior/Cooperation:  cooperative, restless and fidgety , eye contact minimal  Speech:  pressured, rapid; requiring redirection   Language: grossly intact, able to name, able to repeat with spontaneous speech  Mood: "I'm fine, just stressed"  Affect:  Elevated ; tense   Associations: intact; no PURVI  Thought Process: linear with frequent intermittent tangential/circumferential TP (requiring redirection)  Thought Content: denies SI, HI, AH, VH, delusions, or paranoia (no RIS observed)  Sensorium:  Awake  Alert and Oriented: to person, place, situation, month of year, year  Memory: 3/3 immediate, 2/3 at 5 minutes    Recent:  Intact; able to report recent events   Remote:  Intact; Named 4/4 past presidents   Attention/concentration: " reduced; appropriate for age/education. Able to spell [id number] & [id number]-r-[id number]   Similarities: Intact; (difference between apple and orange?)  Abstract reasoning: Intact  Insight:  Limited  Judgment: Impaired / Limited    CAM ICU Delirium Assessment - NEGATIVE      MEDICAL DECISION MAKING    ASSESSMENT      Unspecified Bipolar and Related Disorder (w/ recent SI & HI)  Unspecified Anxiety Disorder  Stimulant Abuse  Opiate Abuse  (rule out SIMD/SIPD)     RECOMMENDATIONS       - Patient currently meets PEC criteria due to currently being an imminent threat to self/others 2/2 mental illness at this time.     - Once medically cleared, seek inpatient psychiatric admission for treatment / stabilization      - Defer scheduled psychiatric medications to inpatient psychiatric treatment team    - Defer non-psychiatric medications to ED MD    - Can use Zyprexa 10 mg PO/IM q8 hours PRN for non-redirectable psychotic/manic agitation      - Psychotherapy was performed with patient as noted above      - Follow up UDS (pending)    - Please maintain suicide/violence precautions as well as monitoring of the patient with sitter while awaiting inpatient psychiatric admission         Time spent with patient (excluding the time spent on psychotherapy): 60 minutes      More than 50% of the time was spent counseling/coordinating care      STAFF:  Efren Hernandez MD  [facility]  9/10/2020

## 2020-09-10 NOTE — PLAN OF CARE
PEC filed.    VSS.    Labs unremarkable other than UDS + amphetamines.    Pt medically cleared for psych transfer.    Ayaan Rodriguez MD

## 2020-09-10 NOTE — ED NOTES
Physician at bedside. The patient reports having a verbal argument with family members this morning. He states he is stressed over living situation, living with his mother, brother/wife/children who are not contributing to household bills. He feels like he is not treated well by family members and is taken advantage of. He states during the argument this morning that he told them he was moving out and that the stress is going to kill him, not that he was going to harm himself. He advocates that out of anger they called police/911 reporting that he was going to hurt himself. He does report a past suicidal ideation when he was using drugs. He states he has been sober from drugs for a couple of years. He denies ever actually attempting self harm or harm to others. He denies SI/HI presently. He is concerned over not getting to work today. He agrees to Dr. Rodriguez speaking to family members on phone regarding the incident.

## 2020-09-10 NOTE — ED NOTES
Pt. Is crying/tearful. Pt. Is unable to urinate yet, drinking fluids p.o.. regular diet tray given.

## 2020-09-10 NOTE — ED NOTES
Pt. Is awake and resting quietly. Discussed plan of care with pt.-transfer to [place] for further psychiatric treatment. Pt. Is agreeable/cooperative to plan of care. Also instructed pt. That mother dropped off vias card for him to activate-card placed inside belongings bag and given to spd personal.

## 2020-09-10 NOTE — NURSING
31y/o wm,who have a hx:of drug abuse,argument with a family member,and mother,reports he have been stressed from life,and work.stated he told family he was going to die from stress?did threatened to decapitate mother and his brothers girlfriend,and kill himself,police called after threat,was taken to [facility] for an eval,parent states he is quite manipulative,and a danger to himself,was accepted by ,her dx psychosis,and amphetamine use,presented to [facility],denying all accusations presented by his family members,denies h/s ideations,is alert and orientated,have been orientated to unit ,showered,did engage in his admission process,close obs initiated,q 15 mins.as per drs.order.coroners office notified of cec due  @[phone].

## 2020-09-10 NOTE — ED PROVIDER NOTES
"Encounter Date: 9/10/2020    SCRIBE #1 NOTE: I, Coy Guerrero, am scribing for, and in the presence of, Ayaan Rodriguez MD.       History     Chief Complaint   Patient presents with    altercation     pt. reports getting into a verbal arguement with his mother this morning and made a threat to hurt himself. the pt. states he was only trying to make her mad and denies si.      This is a 30 year-old WM who has a past medical history of Asthma and Drug abuse, presents via EMS for mental health evaluation. Patient was involved in a verbal argument with his mother and brother's girlfriend this morning. Patient reports he has been stressed with work and is expected to clean and take care of all the work at home. Patient says that he told his family the stress is going to kill him and not that he wanted to kill himself. The police were called who wanted him to be evaluated.     On arrival to the ER he denies any SI, HI, or AVH. He states he told his family that the stress they were causing him would kill him and not that he wanted to kill himself. Patient does report a history of drug problems years ago and adds that he had prior history of SI/HI while he was abusing drugs. States he has been drug free for a while now but does smoke marijuana "once in a blue moon with friends." He denies any history of bipolar disorder or schizophrenia. Denies history of psychiatric hospitalization. He denies any current SI/HI or AVH. He is a smoker but denies recent alcohol intake or drug use. Patient works in [employer] services and states he is supposed to be going to work this morning.    Spoke to the patient's mother and brother's girlfriend over the telephone who state that the patient threatened to decapitate them and kill himself, and kill the police if they were called. They feel he is a danger to himself and is afraid what will happen if he comes home. Mother says the patient is very manipulative. Of note the patient reportedly sent a " text message to his mother, brother's girlfriend, and brother, threatening them and describing what he was going to do to them involving the decapitation.    The history is provided by the patient.     Review of patient's allergies indicates:  No Known Allergies  Past Medical History:   Diagnosis Date    Asthma     Drug abuse      Past Surgical History:   Procedure Laterality Date    ANTERIOR CRUCIATE LIGAMENT REPAIR Right      No family history on file.  Social History     Tobacco Use    Smoking status: Current Every Day Smoker     Packs/day: 1.00     Types: Cigarettes    Smokeless tobacco: Never Used   Substance Use Topics    Alcohol use: No    Drug use: Yes     Frequency: 1.0 times per week     Types: Marijuana, Cocaine     Review of Systems   Constitutional: Negative for fever.   HENT: Negative for sore throat.    Respiratory: Negative for shortness of breath.    Cardiovascular: Negative for chest pain.   Gastrointestinal: Negative for nausea.   Genitourinary: Negative for dysuria.   Musculoskeletal: Negative for back pain.   Skin: Negative for rash.   Neurological: Negative for weakness.   Hematological: Does not bruise/bleed easily.   Psychiatric/Behavioral: Positive for suicidal ideas. Negative for hallucinations.        + HI   All other systems reviewed and are negative.      Physical Exam     Initial Vitals [09/10/20 0912]   BP Pulse Resp Temp SpO2   115/81 96 18 97.9 °F (36.6 °C) 96 %      MAP       --         Physical Exam    Nursing note and vitals reviewed.  Constitutional: He appears well-developed and well-nourished.   HENT:   Head: Normocephalic and atraumatic.   Eyes: EOM are normal. Pupils are equal, round, and reactive to light.   Neck: Normal range of motion. Neck supple.   Cardiovascular: Normal rate, regular rhythm, normal heart sounds and intact distal pulses.   Pulmonary/Chest: Breath sounds normal. No respiratory distress. He has no wheezes.   Abdominal: Soft. He exhibits no  distension. There is no abdominal tenderness.   Musculoskeletal: Normal range of motion. No edema.   Neurological: He is alert and oriented to person, place, and time.   Skin: Skin is warm and dry.   Psychiatric: He has a normal mood and affect.   Rapid speech pattern, tangential  Denies SI/HI/AVH  Denies decreased need for sleep, engaging in dangerous or destructive activities          ED Course   Procedures  Labs Reviewed   CBC W/ AUTO DIFFERENTIAL - Abnormal; Notable for the following components:       Result Value    Mean Corpuscular Hemoglobin 31.4 (*)     All other components within normal limits   COMPREHENSIVE METABOLIC PANEL - Abnormal; Notable for the following components:    Anion Gap 4 (*)     All other components within normal limits   URINALYSIS, REFLEX TO URINE CULTURE - Abnormal; Notable for the following components:    Specific Gravity, UA >=1.030 (*)     Protein, UA 1+ (*)     All other components within normal limits    Narrative:     Specimen Source->Urine   ACETAMINOPHEN LEVEL - Abnormal; Notable for the following components:    Acetaminophen (Tylenol), Serum <3.0 (*)     All other components within normal limits   SALICYLATE LEVEL - Abnormal; Notable for the following components:    Salicylate Lvl <5.0 (*)     All other components within normal limits   URINALYSIS MICROSCOPIC - Abnormal; Notable for the following components:    Granular Casts, UA 2 (*)     All other components within normal limits    Narrative:     Specimen Source->Urine   TSH   DRUG SCREEN PANEL, URINE EMERGENCY    Narrative:     Specimen Source->Urine   ALCOHOL,MEDICAL (ETHANOL)   SARS-COV-2 RNA AMPLIFICATION, QUAL          Imaging Results    None          Medical Decision Making:   Clinical Tests:   Lab Tests: Reviewed and Ordered  Radiological Study: Ordered and Reviewed  ED Management:  - Pt reportedly threatening to decapitate family members; spoke to family who feel pt is danger to self and others; PEC filed  - Routine  psych labs unremarkable other than +UDS (amphetamines)  - Pt medically cleared for psych transfer        Other:   I have discussed this case with another health care provider.       <> Summary of the Discussion: Discussed with Efren Hernandez MD (psych) who is in agreement with [facility], tx to psych facility.                    ED Course as of Sep 10 1322   Thu Sep 10, 2020   1019 Spoke to Efren Hernandez, Psychiatry, who will come see the patient.    [HN]      ED Course User Index  [HN] Coy Guerrero            Clinical Impression:       ICD-10-CM ICD-9-CM   1. Homicidal ideation  R45.850 V62.85   2. Medical clearance for psychiatric admission  Z00.8 V70.8               ED Disposition Condition    Transfer to Psych Facility         ED Prescriptions     None        Follow-up Information    None                       I, Ayaan Rodriguez,  personally performed the services described in this documentation. All medical record entries made by the scribe were at my direction and in my presence.  I have reviewed the chart and agree that the record reflects my personal performance and is accurate and complete. Ayaan Rodriguez M.D. 1:22 PM09/10/2020               Ayaan Rodriguez MD  09/10/20 1328

## 2020-09-10 NOTE — ED NOTES
Dr. Rodriguez at bedside to discuss plan of care after talking to family members at home on the phone. He discussed with pt. The allegations of threatening physical harm to them and threatening to kill them. They allege they are fearful od his aggressive, unstable behavior. The pt. Became upset, mood is labile and pt. Is tearful at times but agrees to be cooperative to PEC protocol/process and phychiatric evaluation.

## 2020-09-11 PROCEDURE — 25000003 PHARM REV CODE 250: Performed by: PSYCHIATRY & NEUROLOGY

## 2020-09-11 PROCEDURE — 25000003 PHARM REV CODE 250: Performed by: NURSE PRACTITIONER

## 2020-09-11 PROCEDURE — 11400000 HC PSYCH PRIVATE ROOM

## 2020-09-11 RX ORDER — MIRTAZAPINE 15 MG/1
15 TABLET, FILM COATED ORAL NIGHTLY
Status: DISCONTINUED | OUTPATIENT
Start: 2020-09-11 | End: 2020-09-15 | Stop reason: HOSPADM

## 2020-09-11 RX ORDER — BUPROPION HYDROCHLORIDE 150 MG/1
150 TABLET ORAL DAILY
Status: DISCONTINUED | OUTPATIENT
Start: 2020-09-11 | End: 2020-09-15 | Stop reason: HOSPADM

## 2020-09-11 RX ORDER — ADHESIVE BANDAGE
30 BANDAGE TOPICAL DAILY PRN
Status: DISCONTINUED | OUTPATIENT
Start: 2020-09-11 | End: 2020-09-15 | Stop reason: HOSPADM

## 2020-09-11 RX ORDER — MAG HYDROX/ALUMINUM HYD/SIMETH 200-200-20
30 SUSPENSION, ORAL (FINAL DOSE FORM) ORAL EVERY 4 HOURS PRN
Status: DISCONTINUED | OUTPATIENT
Start: 2020-09-11 | End: 2020-09-15 | Stop reason: HOSPADM

## 2020-09-11 RX ORDER — HYDROXYZINE PAMOATE 50 MG/1
50 CAPSULE ORAL EVERY 4 HOURS PRN
Status: DISCONTINUED | OUTPATIENT
Start: 2020-09-11 | End: 2020-09-15 | Stop reason: HOSPADM

## 2020-09-11 RX ORDER — MIRTAZAPINE 15 MG/1
15 TABLET, FILM COATED ORAL NIGHTLY PRN
Status: DISCONTINUED | OUTPATIENT
Start: 2020-09-11 | End: 2020-09-15 | Stop reason: HOSPADM

## 2020-09-11 RX ORDER — ACETAMINOPHEN 325 MG/1
650 TABLET ORAL EVERY 4 HOURS PRN
Status: DISCONTINUED | OUTPATIENT
Start: 2020-09-11 | End: 2020-09-15 | Stop reason: HOSPADM

## 2020-09-11 RX ORDER — DIPHENHYDRAMINE HCL 25 MG
25 CAPSULE ORAL EVERY 4 HOURS PRN
Status: DISCONTINUED | OUTPATIENT
Start: 2020-09-11 | End: 2020-09-15 | Stop reason: HOSPADM

## 2020-09-11 RX ORDER — ZIPRASIDONE HYDROCHLORIDE 20 MG/1
20 CAPSULE ORAL EVERY 4 HOURS PRN
Status: DISCONTINUED | OUTPATIENT
Start: 2020-09-11 | End: 2020-09-15 | Stop reason: HOSPADM

## 2020-09-11 RX ADMIN — MIRTAZAPINE 15 MG: 15 TABLET, FILM COATED ORAL at 08:09

## 2020-09-11 RX ADMIN — BUPROPION HYDROCHLORIDE 150 MG: 150 TABLET, FILM COATED, EXTENDED RELEASE ORAL at 12:09

## 2020-09-11 RX ADMIN — RISPERIDONE 1 MG: 1 TABLET ORAL at 08:09

## 2020-09-11 NOTE — PLAN OF CARE
Problem: Adult Inpatient Plan of Care  Goal: Plan of Care Review  Outcome: Ongoing, Progressing  Goal: Patient-Specific Goal (Individualization)  Outcome: Ongoing, Progressing  Goal: Absence of Hospital-Acquired Illness or Injury  Outcome: Ongoing, Progressing  Goal: Optimal Comfort and Wellbeing  Outcome: Ongoing, Progressing  Goal: Readiness for Transition of Care  Outcome: Ongoing, Progressing  Goal: Rounds/Family Conference  Outcome: Ongoing, Progressing     Problem: Wound  Goal: Optimal Wound Healing  Outcome: Ongoing, Progressing     Problem: Adult Behavioral Health Plan of Care  Goal: Plan of Care Review  Outcome: Ongoing, Progressing  Goal: Patient-Specific Goal (Individualization)  Outcome: Ongoing, Progressing  Goal: Adheres to Safety Considerations for Self and Others  Outcome: Ongoing, Progressing  Goal: Optimized Coping Skills in Response to Life Stressors  Outcome: Ongoing, Progressing  Goal: Develops/Participates in Therapeutic [place] to Support Successful Transition  Outcome: Ongoing, Progressing  Goal: Rounds/Family Conference  Outcome: Ongoing, Progressing

## 2020-09-11 NOTE — PLAN OF CARE
Problem: Adult Behavioral Health Plan of Care  Goal: Optimized Coping Skills in Response to Life Stressors  Outcome: Ongoing, Progressing  Goal: Develops/Participates in Therapeutic [place] to Support Successful Transition  Outcome: Ongoing, Progressing       B- Actively participated.    I-  Effective communication    R- Pt. Interjected affirmative comments about communicating    P-  Continue to encourage group therapy attendance and participation.

## 2020-09-11 NOTE — PLAN OF CARE
"PATIENT IS DEPRESSED AND ISOLATIVE, C/O INSOMNIA  PATIENT STATING " I DON'T BELONG HERE, I DID NOT MEAN WHAT I SAID, AND THEY PUT ME HERE".  ACCEPTING MEALS AND MEDICATION WITHOUT DIFFICULTY.  ROSA ISELA MCBRIDE NP VISITED PER TELEMED WITH ORDERS FOR WELLBUTRIN AND REMERON (SEE EMAR). FIRST DOSE OF WELLBUTRIN ADMINISTERED.  NO NEGATIVE BEHAVIORS. DENIES S/I AND H/I  CLOSE OBSERVATION MONITORING CONTINUED  "

## 2020-09-11 NOTE — HPI
"Chief Complaint   Patient presents with    altercation       pt. reports getting into a verbal arguement with his mother this morning and made a threat to hurt himself. the pt. states he was only trying to make her mad and denies si.      This is a 30 year-old WM who has a past medical history of Asthma and Drug abuse, presents via EMS for mental health evaluation. Patient was involved in a verbal argument with his mother and brother's girlfriend this morning. Patient reports he has been stressed with work and is expected to clean and take care of all the work at home. Patient says that he told his family the stress is going to kill him and not that he wanted to kill himself. The police were called who wanted him to be evaluated.      On arrival to the ER he denies any SI, HI, or AVH. He states he told his family that the stress they were causing him would kill him and not that he wanted to kill himself. Patient does report a history of drug problems years ago and adds that he had prior history of SI/HI while he was abusing drugs. States he has been drug free for a while now but does smoke marijuana "once in a blue moon with friends." He denies any history of bipolar disorder or schizophrenia. Denies history of psychiatric hospitalization. He denies any current SI/HI or AVH. He is a smoker but denies recent alcohol intake or drug use. Patient works in [employer] services and states he is supposed to be going to work this morning.     Spoke to the patient's mother and brother's girlfriend over the telephone who state that the patient threatened to decapitate them and kill himself, and kill the police if they were called. They feel he is a danger to himself and is afraid what will happen if he comes home. Mother says the patient is very manipulative. Of note the patient reportedly sent a text message to his mother, brother's girlfriend, and brother, threatening them and describing what he was going to do to them involving " the decapitation.     The history is provided by the patient.

## 2020-09-11 NOTE — PSYCH
[facility]  Psychosocial Assessment    Date: 2020  Time: 10:33 AM    Name: Nagi Castaneda    Age: 30 y.o.       : 1990         Race:     Precipitating Event: family conflict, hopelessness/helplessness and substance abuse    Symptoms: considered suicide and homicide    Marital Status: single    Spouse/Significant Other:    Quality of Relationship:    Education: technical college    Occupation:    Employer/School:    Medical/Psychiatric History   Past Psychiatric History: Inpatient at [facility]    No current TXCurrently in treatment with None    Medical Conditions/including prior head injury: See past medical history    Suicidal Ideation/Homicidal Ideation:  PEC/CEC    Current Medications:   Current Facility-Administered Medications:     risperiDONE tablet 1 mg, 1 mg, Oral, BID, Fernanda Mallory MD, 1 mg at 20 0815    Allergies: Review of patient's allergies indicates:  No Known Allergies    Family History  Mother: Torie Castaneda  Present Age:63  Occupation:disabled  Medical/Psychiatric History: Legally blind No psych history    Father:   Present Age: 57 when    Occupation:  Medical/Psychiatric History: Dad  of liver issues related to alcohol  Siblings and present ages: 1 older brother, Sky Castaneda, 34    Medical or Psychiatric Problems: No medical or psych    Relationships with parents and siblings:  Relationship with mom is good.  Relationship prior to father's death was good    Developmental History  Place of birth: [place]    Developmental Milestones: Patient reports all met    History of Physical/Sexual Abuse:    Childhood Behavioral Problems:    Children  Names/Ages:    Medical or Psychiatric Problems:    Quality of Parent/Child Relationship:    Criminal History  Arrests:  Yes - Number of Arrests 5, Disposition Served 4 months,  Until 2021    Miscellaneous:  Financial Issues: yes    Leisure  Activities: hang out with my Thai Mcintyre    Owns a gun? no Secured? N/A     History:  no    Comments:    Discharge Plans:  Will follow-up with outpatient therapist for psychotherapy, outpatient psychiatrist for medication management . Because he works pt request Telemed via Zoom.

## 2020-09-11 NOTE — SUBJECTIVE & OBJECTIVE
Past Medical History:   Diagnosis Date    Asthma     Drug abuse        Past Surgical History:   Procedure Laterality Date    ANTERIOR CRUCIATE LIGAMENT REPAIR Right        Review of patient's allergies indicates:  No Known Allergies    No current facility-administered medications on file prior to encounter.      Current Outpatient Medications on File Prior to Encounter   Medication Sig    albuterol 90 mcg/actuation inhaler Inhale 1-2 puffs into the lungs every 6 (six) hours as needed for Wheezing or Shortness of Breath. Rescue     Family History     None        Tobacco Use    Smoking status: Current Every Day Smoker     Packs/day: 1.00     Types: Cigarettes    Smokeless tobacco: Never Used   Substance and Sexual Activity    Alcohol use: No    Drug use: Yes     Frequency: 1.0 times per week     Types: Marijuana, Cocaine    Sexual activity: Not on file     Review of Systems   Constitutional: Negative for chills and fever.   HENT: Negative for rhinorrhea, sneezing and sore throat.    Eyes: Negative for pain and visual disturbance.   Respiratory: Negative for cough and shortness of breath.    Cardiovascular: Negative for chest pain.   Gastrointestinal: Negative for abdominal pain, constipation and diarrhea.   Endocrine: Negative for cold intolerance and heat intolerance.   Genitourinary: Negative for difficulty urinating.   Musculoskeletal: Negative for arthralgias and joint swelling.   Skin: Negative for rash.   Allergic/Immunologic: Negative for environmental allergies.   Neurological: Negative for dizziness, syncope and weakness.   Hematological: Does not bruise/bleed easily.   Psychiatric/Behavioral: Positive for agitation, behavioral problems, dysphoric mood and suicidal ideas. The patient is not nervous/anxious.      Objective:     Vital Signs (Most Recent):  Temp: 99.2 °F (37.3 °C) (09/10/20 2000)  Pulse: 104 (09/10/20 2000)  Resp: 20 (09/10/20 2000)  BP: 118/73 (09/10/20 2000)  SpO2: 97 % (09/10/20  2000) Vital Signs (24h Range):  Temp:  [97.9 °F (36.6 °C)-99.2 °F (37.3 °C)] 99.2 °F (37.3 °C)  Pulse:  [] 104  Resp:  [18-20] 20  SpO2:  [96 %-100 %] 97 %  BP: (112-142)/(64-81) 118/73     Weight: 95.3 kg (210 lb 1.6 oz)  Body mass index is 28.49 kg/m².    Physical Exam  Vitals signs and nursing note reviewed.   Constitutional:       Appearance: Normal appearance.   HENT:      Head: Normocephalic and atraumatic.      Nose: Nose normal.   Eyes:      Extraocular Movements: Extraocular movements intact.      Pupils: Pupils are equal, round, and reactive to light.   Neck:      Musculoskeletal: Normal range of motion and neck supple.   Cardiovascular:      Rate and Rhythm: Normal rate and regular rhythm.      Heart sounds: No murmur. No friction rub. No gallop.    Pulmonary:      Effort: Pulmonary effort is normal.      Breath sounds: Normal breath sounds.   Abdominal:      General: Abdomen is flat. Bowel sounds are normal.      Palpations: Abdomen is soft.   Musculoskeletal:         General: No swelling or deformity.   Skin:     General: Skin is warm and dry.      Capillary Refill: Capillary refill takes less than 2 seconds.   Neurological:      General: No focal deficit present.      Mental Status: He is alert and oriented to person, place, and time.   Psychiatric:         Mood and Affect: Mood normal.         Behavior: Behavior normal.           CRANIAL NERVES     CN III, IV, VI   Pupils are equal, round, and reactive to light.    Lab Results   Component Value Date    WBC 5.10 09/10/2020    RBC 4.68 09/10/2020    HGB 14.7 09/10/2020    HCT 43.2 09/10/2020    MCV 92 09/10/2020    MCH 31.4 (H) 09/10/2020    MCHC 34.0 09/10/2020    RDW 11.9 09/10/2020     09/10/2020    MPV 9.2 09/10/2020    GRAN 3.2 09/10/2020    GRAN 61.9 09/10/2020    LYMPH 1.3 09/10/2020    LYMPH 25.1 09/10/2020    MONO 0.4 09/10/2020    MONO 7.5 09/10/2020    EOS 0.2 09/10/2020    BASO 0.03 09/10/2020    EOSINOPHIL 4.7 09/10/2020     BASOPHIL 0.6 09/10/2020        CMP  Lab Results   Component Value Date     09/10/2020    K 3.8 09/10/2020     09/10/2020    CO2 29 09/10/2020    GLU 99 09/10/2020    BUN 10 09/10/2020    CREATININE 0.9 09/10/2020    CALCIUM 8.7 09/10/2020    PROT 7.2 09/10/2020    ALBUMIN 4.3 09/10/2020    BILITOT 0.5 09/10/2020    ALKPHOS 62 09/10/2020    AST 22 09/10/2020    ALT 25 09/10/2020    ANIONGAP 4 (L) 09/10/2020    ESTGFRAFRICA >60 09/10/2020    EGFRNONAA >60 09/10/2020        No results found for: LABBLOO, LABURIN, RESPIRATORYC, GSRESP            Significant Labs: All pertinent labs within the past 24 hours have been reviewed.    Significant Imaging: I have reviewed and interpreted all pertinent imaging results/findings within the past 24 hours.

## 2020-09-11 NOTE — H&P
"[facility] Hospital Medicine  History & Physical    Patient Name: Nagi Castaneda  MRN: [id number]  Admission Date: 9/10/2020  Attending Physician: Fernanda Mallory MD   Primary Care Provider: [facility]         Patient information was obtained from patient and ER records.     Subjective:     Principal Problem:<principal problem not specified>    Chief Complaint: No chief complaint on file.       HPI:   Chief Complaint   Patient presents with    altercation       pt. reports getting into a verbal arguement with his mother this morning and made a threat to hurt himself. the pt. states he was only trying to make her mad and denies si.      This is a 30 year-old WM who has a past medical history of Asthma and Drug abuse, presents via EMS for mental health evaluation. Patient was involved in a verbal argument with his mother and brother's girlfriend this morning. Patient reports he has been stressed with work and is expected to clean and take care of all the work at home. Patient says that he told his family the stress is going to kill him and not that he wanted to kill himself. The police were called who wanted him to be evaluated.      On arrival to the ER he denies any SI, HI, or AVH. He states he told his family that the stress they were causing him would kill him and not that he wanted to kill himself. Patient does report a history of drug problems years ago and adds that he had prior history of SI/HI while he was abusing drugs. States he has been drug free for a while now but does smoke marijuana "once in a blue moon with friends." He denies any history of bipolar disorder or schizophrenia. Denies history of psychiatric hospitalization. He denies any current SI/HI or AVH. He is a smoker but denies recent alcohol intake or drug use. Patient works in [employer] and states he is supposed to be going to work this morning.     Spoke to the patient's mother and " brother's girlfriend over the telephone who state that the patient threatened to decapitate them and kill himself, and kill the police if they were called. They feel he is a danger to himself and is afraid what will happen if he comes home. Mother says the patient is very manipulative. Of note the patient reportedly sent a text message to his mother, brother's girlfriend, and brother, threatening them and describing what he was going to do to them involving the decapitation.     The history is provided by the patient.       Past Medical History:   Diagnosis Date    Asthma     Drug abuse        Past Surgical History:   Procedure Laterality Date    ANTERIOR CRUCIATE LIGAMENT REPAIR Right        Review of patient's allergies indicates:  No Known Allergies    No current facility-administered medications on file prior to encounter.      Current Outpatient Medications on File Prior to Encounter   Medication Sig    albuterol 90 mcg/actuation inhaler Inhale 1-2 puffs into the lungs every 6 (six) hours as needed for Wheezing or Shortness of Breath. Rescue     Family History     None        Tobacco Use    Smoking status: Current Every Day Smoker     Packs/day: 1.00     Types: Cigarettes    Smokeless tobacco: Never Used   Substance and Sexual Activity    Alcohol use: No    Drug use: Yes     Frequency: 1.0 times per week     Types: Marijuana, Cocaine    Sexual activity: Not on file     Review of Systems   Constitutional: Negative for chills and fever.   HENT: Negative for rhinorrhea, sneezing and sore throat.    Eyes: Negative for pain and visual disturbance.   Respiratory: Negative for cough and shortness of breath.    Cardiovascular: Negative for chest pain.   Gastrointestinal: Negative for abdominal pain, constipation and diarrhea.   Endocrine: Negative for cold intolerance and heat intolerance.   Genitourinary: Negative for difficulty urinating.   Musculoskeletal: Negative for arthralgias and joint swelling.    Skin: Negative for rash.   Allergic/Immunologic: Negative for environmental allergies.   Neurological: Negative for dizziness, syncope and weakness.   Hematological: Does not bruise/bleed easily.   Psychiatric/Behavioral: Positive for agitation, behavioral problems, dysphoric mood and suicidal ideas. The patient is not nervous/anxious.      Objective:     Vital Signs (Most Recent):  Temp: 99.2 °F (37.3 °C) (09/10/20 2000)  Pulse: 104 (09/10/20 2000)  Resp: 20 (09/10/20 2000)  BP: 118/73 (09/10/20 2000)  SpO2: 97 % (09/10/20 2000) Vital Signs (24h Range):  Temp:  [97.9 °F (36.6 °C)-99.2 °F (37.3 °C)] 99.2 °F (37.3 °C)  Pulse:  [] 104  Resp:  [18-20] 20  SpO2:  [96 %-100 %] 97 %  BP: (112-142)/(64-81) 118/73     Weight: 95.3 kg (210 lb 1.6 oz)  Body mass index is 28.49 kg/m².    Physical Exam  Vitals signs and nursing note reviewed.   Constitutional:       Appearance: Normal appearance.   HENT:      Head: Normocephalic and atraumatic.      Nose: Nose normal.   Eyes:      Extraocular Movements: Extraocular movements intact.      Pupils: Pupils are equal, round, and reactive to light.   Neck:      Musculoskeletal: Normal range of motion and neck supple.   Cardiovascular:      Rate and Rhythm: Normal rate and regular rhythm.      Heart sounds: No murmur. No friction rub. No gallop.    Pulmonary:      Effort: Pulmonary effort is normal.      Breath sounds: Normal breath sounds.   Abdominal:      General: Abdomen is flat. Bowel sounds are normal.      Palpations: Abdomen is soft.   Musculoskeletal:         General: No swelling or deformity.   Skin:     General: Skin is warm and dry.      Capillary Refill: Capillary refill takes less than 2 seconds.   Neurological:      General: No focal deficit present.      Mental Status: He is alert and oriented to person, place, and time.   Psychiatric:         Mood and Affect: Mood normal.         Behavior: Behavior normal.           CRANIAL NERVES     CN III, IV, VI   Pupils  are equal, round, and reactive to light.    Lab Results   Component Value Date    WBC 5.10 09/10/2020    RBC 4.68 09/10/2020    HGB 14.7 09/10/2020    HCT 43.2 09/10/2020    MCV 92 09/10/2020    MCH 31.4 (H) 09/10/2020    MCHC 34.0 09/10/2020    RDW 11.9 09/10/2020     09/10/2020    MPV 9.2 09/10/2020    GRAN 3.2 09/10/2020    GRAN 61.9 09/10/2020    LYMPH 1.3 09/10/2020    LYMPH 25.1 09/10/2020    MONO 0.4 09/10/2020    MONO 7.5 09/10/2020    EOS 0.2 09/10/2020    BASO 0.03 09/10/2020    EOSINOPHIL 4.7 09/10/2020    BASOPHIL 0.6 09/10/2020        CMP  Lab Results   Component Value Date     09/10/2020    K 3.8 09/10/2020     09/10/2020    CO2 29 09/10/2020    GLU 99 09/10/2020    BUN 10 09/10/2020    CREATININE 0.9 09/10/2020    CALCIUM 8.7 09/10/2020    PROT 7.2 09/10/2020    ALBUMIN 4.3 09/10/2020    BILITOT 0.5 09/10/2020    ALKPHOS 62 09/10/2020    AST 22 09/10/2020    ALT 25 09/10/2020    ANIONGAP 4 (L) 09/10/2020    ESTGFRAFRICA >60 09/10/2020    EGFRNONAA >60 09/10/2020        No results found for: LABBLOO, LABURIN, RESPIRATORYC, GSRESP            Significant Labs: All pertinent labs within the past 24 hours have been reviewed.    Significant Imaging: I have reviewed and interpreted all pertinent imaging results/findings within the past 24 hours.    Assessment/Plan:     Depression, major, recurrent, severe with psychosis  Continue treatment per inpatient psych.      Suicidal ideation  Continue treatment per inpatient psych.      Polysubstance abuse  Continue to monitor for signs and symptoms of withdrawal.        VTE Risk Mitigation (From admission, onward)    None             Jose Martinez Jr, MD  Department of Hospital Medicine   [facility]

## 2020-09-12 PROBLEM — F15.10: Status: ACTIVE | Noted: 2020-09-11

## 2020-09-12 PROBLEM — F12.10 CANNABIS ABUSE: Status: ACTIVE | Noted: 2020-09-11

## 2020-09-12 PROBLEM — F33.3 DEPRESSION, MAJOR, RECURRENT, SEVERE WITH PSYCHOSIS: Chronic | Status: ACTIVE | Noted: 2020-09-10

## 2020-09-12 PROCEDURE — 11400000 HC PSYCH PRIVATE ROOM

## 2020-09-12 PROCEDURE — 25000003 PHARM REV CODE 250: Performed by: PSYCHIATRY & NEUROLOGY

## 2020-09-12 PROCEDURE — 25000003 PHARM REV CODE 250: Performed by: NURSE PRACTITIONER

## 2020-09-12 RX ORDER — RISPERIDONE 1 MG/1
1 TABLET ORAL NIGHTLY
Status: DISCONTINUED | OUTPATIENT
Start: 2020-09-12 | End: 2020-09-15 | Stop reason: HOSPADM

## 2020-09-12 RX ORDER — RISPERIDONE 1 MG/1
1 TABLET ORAL 2 TIMES DAILY
Status: DISCONTINUED | OUTPATIENT
Start: 2020-09-12 | End: 2020-09-12

## 2020-09-12 RX ADMIN — RISPERIDONE 1 MG: 1 TABLET ORAL at 08:09

## 2020-09-12 RX ADMIN — BUPROPION HYDROCHLORIDE 150 MG: 150 TABLET, FILM COATED, EXTENDED RELEASE ORAL at 08:09

## 2020-09-12 NOTE — PLAN OF CARE
PATIENT DEPRESSED AND ISOLATED.  C/O FEELING TIRED TODAY. APPETITE GOOD AND MEDICATION COMPLIANT.  DENIES S/I AND H/I. NO BEHAVIOR ISSUES OR AGITATION  OXANA DURHAM VISITED PER TELEMED WIT NEW ORDER FOR D/C AM RISPERDAL AND GIVE AT HS.  PATIENT IN ROOM RESTING AT THIS TIME. NO DISTRESS OBSERVED.  CLOSE OBSERVATION MONITORING CONTINUED

## 2020-09-12 NOTE — HOSPITAL COURSE
"9/12/2020  Pt reports mood improved, no anger outbursts, pt reports extreme fatigue and grogginess with Risperdal in AM, states he is unable to stay awake. Pt states he has been compliant with medications. +impulsivity, states he said things he didn't mean out of anger. Pt is discharge focused and minimizes need for treatment. Denies SI/HI today, Appetite good, sleep good with Remeron. Cooperative with staff. Denies cravings.    Plan: DC Risperdal in AM, continue HS dose    9/14/2020  CC: "I was thinking I needed to work on some communication or something with my mom."    C/M, reports thinking a lot about poor communication standing with his mother and brother, his primary support at present. Has been cooperative on the unit, albeit sometimes fidgety and easily distracted behaviors. Has been in contact with loved ones several times a day as well. Appears somewhat remorseful for his behaviors with his family. Compliant with medications without incident, appears no SEs at present. Appetite good, consumes most of meals served with LBM noted 9/13. Decreased agitation on unit. Interacts briefly with select peers, easily prompted by staff. Speech pressured. Can make needs known when he needs to, gait independent and steady, denies pain/discomfort, VSS, denies using dreams/cravings at present.     Plan: FS pending with mother/brother  Continue current POC    9/15/2020  Tx team  Pt was admitted for MELANIE had altercation with family, threatening to hurt them and self. Stressors drug use, overwhelmed with work. UDS + Amphet , BAL-. Slept 9 hrs, LBM yest, eating 100%. VSS. No prns yesterday. No aggression or acting out. Compliant with meds denies SE. On the unit pt verbalized remorse about his actions. Inc interactions and part. Completed assigns and processed with staff. + feedback. Pt inc insight. Today pt says " Way better than when I came. I learned how to deal with stress and triggers. I figured out what my triggers are and " "how to walk away.  It starts with me to make change ". Pt denies MELANIE, Hallucinations, or paranoia,. Patricia meds denies SE. Pt denies cravings "I'm learning other ways to get energy".   "

## 2020-09-12 NOTE — PROGRESS NOTES
[facility]  Psychiatry  Progress Note    Patient Name: Nagi Castaneda  MRN: [id number]   Code Status: Full Code  Admission Date: 9/10/2020  Hospital Length of Stay: 2 days  Expected Discharge Date:   Attending Physician: Fernanda Mallory MD  Primary Care Provider: [facility]    Current Legal Status: Physician's Emergency Certificate (PEC)    Patient information was obtained from patient and ER records.     Subjective:     Principal Problem:<principal problem not specified>    Chief Complaint:     HPI: No notes on file    Hospital Course: 9/12/2020  Pt reports mood improved, no anger outbursts, pt reports extreme fatigue and grogginess with Risperdal in AM, states he is unable to stay awake. Pt states he has been compliant with medications. +impulsivity, states he said things he didn't mean out of anger. Pt is discharge focused and minimizes need for treatment. Denies SI/HI today, Appetite good, sleep good with Remeron. Cooperative with staff. Denies cravings.    Plan: DC Risperdal in AM, continue HS dose    Interval History:    Family History     None        Tobacco Use    Smoking status: Current Every Day Smoker     Packs/day: 1.00     Types: Cigarettes    Smokeless tobacco: Never Used   Substance and Sexual Activity    Alcohol use: No    Drug use: Yes     Frequency: 1.0 times per week     Types: Marijuana, Cocaine    Sexual activity: Not on file     Psychotherapeutics (From admission, onward)    Start     Stop Route Frequency Ordered    09/12/20 2100  risperiDONE tablet 1 mg     Question:  Is the patient competent?  Answer:  Yes    -- Oral Nightly 09/12/20 1521    09/11/20 2207  ziprasidone capsule 20 mg      -- Oral Every 4 hours PRN 09/11/20 2108 09/11/20 2207  mirtazapine tablet 15 mg      -- Oral Nightly PRN 09/11/20 2108 09/11/20 2100  mirtazapine tablet 15 mg      -- Oral Nightly 09/11/20 1132    09/11/20 1145  buPROPion TB24 tablet 150 mg       -- Oral Daily 09/11/20 1132           Review of Systems   Psychiatric/Behavioral: Positive for dysphoric mood.     Objective:     Vital Signs (Most Recent):  Temp: 98.2 °F (36.8 °C) (09/12/20 0822)  Pulse: 105 (09/12/20 0822)  Resp: 20 (09/12/20 0822)  BP: 121/74 (09/12/20 0822)  SpO2: 100 % (09/12/20 0822) Vital Signs (24h Range):  Temp:  [98.2 °F (36.8 °C)-98.4 °F (36.9 °C)] 98.2 °F (36.8 °C)  Pulse:  [] 105  Resp:  [20] 20  SpO2:  [97 %-100 %] 100 %  BP: (112-121)/(61-74) 121/74     Height: 6' (182.9 cm)  Weight: 95.3 kg (210 lb 1.6 oz)  Body mass index is 28.49 kg/m².    No intake or output data in the 24 hours ending 09/12/20 1609    Physical Exam  Neurological:      Mental Status: He is oriented to person, place, and time.   Psychiatric:         Attention and Perception: Attention normal.         Mood and Affect: Mood is anxious and depressed. Affect is labile.         Speech: Speech normal.         Behavior: Behavior is agitated and withdrawn.         Thought Content: Thought content normal.         Cognition and Memory: Cognition normal.         Judgment: Judgment is impulsive.       NEUROLOGICAL EXAMINATION:     MENTAL STATUS   Oriented to person, place, and time.   Speech: speech is normal   Level of consciousness: alert    Significant Labs:   Last 24 Hours:   Recent Lab Results     None          Significant Imaging: I have reviewed all pertinent imaging results/findings within the past 24 hours.    Assessment/Plan:     No notes have been filed under this hospital service.  Service: Psychiatry       Need for Continued Hospitalization:   Psychiatric illness continues to pose a potential threat to life or bodily function, of self or others, thereby requiring the need for continued inpatient psychiatric hospitalization.    Anticipated Disposition: Home or Self Care     Total time:  15 with greater than 50% of this time spent in counseling and/or coordination of care.       Santa Butler NP    Psychiatry  [facility]

## 2020-09-12 NOTE — PLAN OF CARE
Patient compliant with medication administration with no concerns.  Patient slept through the night.

## 2020-09-12 NOTE — SUBJECTIVE & OBJECTIVE
Interval History:    Family History     None        Tobacco Use    Smoking status: Current Every Day Smoker     Packs/day: 1.00     Types: Cigarettes    Smokeless tobacco: Never Used   Substance and Sexual Activity    Alcohol use: No    Drug use: Yes     Frequency: 1.0 times per week     Types: Marijuana, Cocaine    Sexual activity: Not on file     Psychotherapeutics (From admission, onward)    Start     Stop Route Frequency Ordered    09/12/20 2100  risperiDONE tablet 1 mg     Question:  Is the patient competent?  Answer:  Yes    -- Oral Nightly 09/12/20 1521    09/11/20 2207  ziprasidone capsule 20 mg      -- Oral Every 4 hours PRN 09/11/20 2108 09/11/20 2207  mirtazapine tablet 15 mg      -- Oral Nightly PRN 09/11/20 2108 09/11/20 2100  mirtazapine tablet 15 mg      -- Oral Nightly 09/11/20 1132 09/11/20 1145  buPROPion TB24 tablet 150 mg      -- Oral Daily 09/11/20 1132           Review of Systems   Psychiatric/Behavioral: Positive for dysphoric mood.     Objective:     Vital Signs (Most Recent):  Temp: 98.2 °F (36.8 °C) (09/12/20 0822)  Pulse: 105 (09/12/20 0822)  Resp: 20 (09/12/20 0822)  BP: 121/74 (09/12/20 0822)  SpO2: 100 % (09/12/20 0822) Vital Signs (24h Range):  Temp:  [98.2 °F (36.8 °C)-98.4 °F (36.9 °C)] 98.2 °F (36.8 °C)  Pulse:  [] 105  Resp:  [20] 20  SpO2:  [97 %-100 %] 100 %  BP: (112-121)/(61-74) 121/74     Height: 6' (182.9 cm)  Weight: 95.3 kg (210 lb 1.6 oz)  Body mass index is 28.49 kg/m².    No intake or output data in the 24 hours ending 09/12/20 1609    Physical Exam  Neurological:      Mental Status: He is oriented to person, place, and time.   Psychiatric:         Attention and Perception: Attention normal.         Mood and Affect: Mood is anxious and depressed. Affect is labile.         Speech: Speech normal.         Behavior: Behavior is agitated and withdrawn.         Thought Content: Thought content normal.         Cognition and Memory: Cognition normal.          Judgment: Judgment is impulsive.       NEUROLOGICAL EXAMINATION:     MENTAL STATUS   Oriented to person, place, and time.   Speech: speech is normal   Level of consciousness: alert    Significant Labs:   Last 24 Hours:   Recent Lab Results     None          Significant Imaging: I have reviewed all pertinent imaging results/findings within the past 24 hours.

## 2020-09-13 PROCEDURE — 11400000 HC PSYCH PRIVATE ROOM

## 2020-09-13 PROCEDURE — 25000003 PHARM REV CODE 250: Performed by: NURSE PRACTITIONER

## 2020-09-13 PROCEDURE — 25000003 PHARM REV CODE 250: Performed by: PSYCHIATRY & NEUROLOGY

## 2020-09-13 RX ADMIN — MIRTAZAPINE 15 MG: 15 TABLET, FILM COATED ORAL at 08:09

## 2020-09-13 RX ADMIN — RISPERIDONE 1 MG: 1 TABLET ORAL at 08:09

## 2020-09-13 RX ADMIN — BUPROPION HYDROCHLORIDE 150 MG: 150 TABLET, FILM COATED, EXTENDED RELEASE ORAL at 08:09

## 2020-09-13 RX ADMIN — ACETAMINOPHEN 650 MG: 325 TABLET ORAL at 12:09

## 2020-09-13 NOTE — HPI
"CC: "I had got into it with my people."    C/M, reports getting into an altercation with his brother's girlfriend, reports tension mounting with her and her children over the past few weeks. She and the children apparently recently moved into the family home, and he has been increasingly stressed with work and loud noise/stimuli at home, difficulty resting. History of substance abuse and some depression in the past. He reports being sober but when confronted with UDS admits to "smoking with some friends sometimes" and taking Adderal off the street to help with focus and working. Denies appetite disturbances recently, some wt gain. Sleeping with difficulty at home, falls asleep late and has trouble staying asleep on most nights. Denies AH/VH. UDS (+) amphetamines. Per reports, pt verbalized threats to hurt himself, and decapitate his brother/brother's girlfriend. Also threatened physical harm to the police if they were called.   "

## 2020-09-13 NOTE — SUBJECTIVE & OBJECTIVE
Patient History           Medical as of 9/12/2020     Past Medical History     Diagnosis Date Comments Source    Asthma -- -- Provider    Drug abuse -- -- Provider          Pertinent Negatives     Diagnosis Date Noted Comments Source    Anticoagulant long-term use 01/25/2013 -- Provider    Diabetes mellitus 01/25/2013 -- Provider    Hypertension 01/25/2013 -- Provider                  Surgical as of 9/12/2020     Past Surgical History     Procedure Laterality Date Comments Source    ANTERIOR CRUCIATE LIGAMENT REPAIR Right -- -- Provider                  Family as of 9/12/2020    None           Tobacco Use as of 9/12/2020     Smoking Status Smoking Start Date Smoking Quit Date Packs/Day Years Used    Current Every Day Smoker -- -- 1.00 --    Types Comments Smokeless Tobacco Status Smokeless Tobacco Quit Date Source     Cigarettes -- Never Used -- Provider            Alcohol Use as of 9/12/2020     Alcohol Use Drinks/Week Alcohol/Week Comments Source    No   -- -- Provider    Frequency Typical Drinks Binge Drinking        -- -- --              Drug Use as of 9/12/2020     Drug Use Types Frequency Comments Source    Yes  Marijuana, Cocaine 1.0 -- Provider            Sexual Activity as of 9/12/2020     Sexually Active Birth Control Partners Comments Source    -- -- -- -- Provider            Activities of Daily Living as of 9/12/2020    None           Social Documentation as of 9/12/2020    None           Occupational as of 9/12/2020    None           Socioeconomic as of 9/12/2020     Marital Status Spouse Name Number of Children Years Education Education Level Preferred Language Ethnicity Race Source    Single -- -- -- -- English /White White --    Financial Resource Strain Food Insecurity: Worry Food Insecurity: Inability Transportation Needs: Medical Transportation Needs: Non-medical    -- -- -- -- --            Pertinent History     Question Response Comments    Lives with -- --    Place in Birth Order  -- --    Lives in -- --    Number of Siblings -- --    Raised by -- --    Legal Involvement -- --    Childhood Trauma -- --    Criminal History of -- --    Financial Status -- --    Highest Level of Education -- --    Does patient have access to a firearm? -- --     Service -- --    Primary Leisure Activity -- --    Spirituality -- --        Past Medical History:   Diagnosis Date    Asthma     Drug abuse      Past Surgical History:   Procedure Laterality Date    ANTERIOR CRUCIATE LIGAMENT REPAIR Right      Family History     None        Tobacco Use    Smoking status: Current Every Day Smoker     Packs/day: 1.00     Types: Cigarettes    Smokeless tobacco: Never Used   Substance and Sexual Activity    Alcohol use: No    Drug use: Yes     Frequency: 1.0 times per week     Types: Marijuana, Cocaine    Sexual activity: Not on file     Review of patient's allergies indicates:  No Known Allergies    No current facility-administered medications on file prior to encounter.      Current Outpatient Medications on File Prior to Encounter   Medication Sig    albuterol 90 mcg/actuation inhaler Inhale 1-2 puffs into the lungs every 6 (six) hours as needed for Wheezing or Shortness of Breath. Rescue     Psychotherapeutics (From admission, onward)    Start     Stop Route Frequency Ordered    09/12/20 2100  risperiDONE tablet 1 mg     Question:  Is the patient competent?  Answer:  Yes    -- Oral Nightly 09/12/20 1521    09/11/20 2207  ziprasidone capsule 20 mg      -- Oral Every 4 hours PRN 09/11/20 2108 09/11/20 2207  mirtazapine tablet 15 mg      -- Oral Nightly PRN 09/11/20 2108 09/11/20 2100  mirtazapine tablet 15 mg      -- Oral Nightly 09/11/20 1132    09/11/20 1145  buPROPion TB24 tablet 150 mg      -- Oral Daily 09/11/20 1132        Review of Systems   Psychiatric/Behavioral: Positive for agitation, behavioral problems, decreased concentration, dysphoric mood, sleep disturbance and suicidal ideas. The  patient is nervous/anxious.    All other systems reviewed and are negative.    Strengths and Liabilities: Strength: Patient is expressive/articulate., Strength: Patient is physically healthy., Liability: Patient is impulsive., Liability: Patient has poor judgment, Liability: Patient lacks coping skills.    Objective:     Vital Signs (Most Recent):  Temp: 98.2 °F (36.8 °C) (09/12/20 1939)  Pulse: 85 (09/12/20 1939)  Resp: 16 (09/12/20 1939)  BP: 133/63 (09/12/20 1939)  SpO2: 96 % (09/12/20 1939) Vital Signs (24h Range):  Temp:  [98.2 °F (36.8 °C)] 98.2 °F (36.8 °C)  Pulse:  [] 85  Resp:  [16-20] 16  SpO2:  [96 %-100 %] 96 %  BP: (121-133)/(63-74) 133/63     Height: 6' (182.9 cm)  Weight: 95.3 kg (210 lb 1.6 oz)  Body mass index is 28.49 kg/m².    No intake or output data in the 24 hours ending 09/12/20 2118    Physical Exam  Neurological:      Mental Status: He is oriented to person, place, and time.   Psychiatric:         Attention and Perception: He is inattentive.         Mood and Affect: Mood is anxious and depressed. Affect is labile.         Speech: Speech is rapid and pressured.         Behavior: Behavior is agitated and withdrawn.         Thought Content: Thought content is paranoid.         Cognition and Memory: Cognition and memory normal.         Judgment: Judgment is impulsive and inappropriate.       NEUROLOGICAL EXAMINATION:     MENTAL STATUS   Oriented to person, place, and time.   Registration: recalls 3 of 3 objects. Recall at 5 minutes: recalls 2 of 3 objects. Follows 2 step commands.   Attention: decreased. Concentration: decreased.   Level of consciousness: alert ,  arousable by verbal stimuli  Knowledge: poor and inconsistent with education. Unable to perform simple calculations.   Able to name object. Able to read. Able to repeat. Able to write. Abnormal comprehension. Praxis: normal     Significant Labs:   Last 24 Hours:   Recent Lab Results     None          Significant Imaging: I have  reviewed all pertinent imaging results/findings within the past 24 hours.
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
## 2020-09-13 NOTE — NURSING
"PT ISOLATIVE TO ROOM, SOMNOLENT.  EASILY AROUSED BY VERBAL STIMULI.  REQUESTED PT COME TO GET HIS MEDS, BUT HE STATES, "THEY MAKE ME TOO SLEEPY."  WILL CONTINUE TO OBSERVE PT.-GARY WEBB LPN  "

## 2020-09-13 NOTE — NURSING
Pt. Is oriented x 3. He denied any c/o auditory or visual hallucinations. H e also deneid any c/o suicidal or homicidal ideations. Pt. Took medications on this shift.  Will cont. To monitor pt.

## 2020-09-14 PROCEDURE — 25000003 PHARM REV CODE 250: Performed by: NURSE PRACTITIONER

## 2020-09-14 PROCEDURE — 11400000 HC PSYCH PRIVATE ROOM

## 2020-09-14 RX ADMIN — MIRTAZAPINE 15 MG: 15 TABLET, FILM COATED ORAL at 08:09

## 2020-09-14 RX ADMIN — BUPROPION HYDROCHLORIDE 150 MG: 150 TABLET, FILM COATED, EXTENDED RELEASE ORAL at 09:09

## 2020-09-14 RX ADMIN — RISPERIDONE 1 MG: 1 TABLET ORAL at 08:09
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
## 2020-09-14 NOTE — PLAN OF CARE
POC reviewed this shift and is ongoing. Patient calm and cooperative. . Denies Suicidal Ideation, Homicidal Ideation, Auditory Hallucinations, Visual Hallucinations, Tactile Hallucinations, and Gustatory Hallucinations. States he feels better and is ready to be discharged.    Jose Rosario RN

## 2020-09-14 NOTE — SUBJECTIVE & OBJECTIVE
Interval History:     Family History     None        Tobacco Use    Smoking status: Current Every Day Smoker     Packs/day: 1.00     Types: Cigarettes    Smokeless tobacco: Never Used   Substance and Sexual Activity    Alcohol use: No    Drug use: Yes     Frequency: 1.0 times per week     Types: Marijuana, Cocaine    Sexual activity: Not on file     Psychotherapeutics (From admission, onward)    Start     Stop Route Frequency Ordered    09/12/20 2100  risperiDONE tablet 1 mg     Question:  Is the patient competent?  Answer:  Yes    -- Oral Nightly 09/12/20 1521    09/11/20 2207  ziprasidone capsule 20 mg      -- Oral Every 4 hours PRN 09/11/20 2108 09/11/20 2207  mirtazapine tablet 15 mg      -- Oral Nightly PRN 09/11/20 2108 09/11/20 2100  mirtazapine tablet 15 mg      -- Oral Nightly 09/11/20 1132 09/11/20 1145  buPROPion TB24 tablet 150 mg      -- Oral Daily 09/11/20 1132           Review of Systems   Psychiatric/Behavioral: Positive for agitation (decreased), behavioral problems, decreased concentration and dysphoric mood. The patient is nervous/anxious.    All other systems reviewed and are negative.    Objective:     Vital Signs (Most Recent):  Temp: 98 °F (36.7 °C) (09/14/20 0754)  Pulse: 106 (09/14/20 0754)  Resp: 20 (09/14/20 0754)  BP: 131/82 (09/14/20 0754)  SpO2: 99 % (09/14/20 0754) Vital Signs (24h Range):  Temp:  [98 °F (36.7 °C)-98.8 °F (37.1 °C)] 98 °F (36.7 °C)  Pulse:  [] 106  Resp:  [18-20] 20  SpO2:  [97 %-99 %] 99 %  BP: (129-131)/(56-82) 131/82     Height: 6' (182.9 cm)  Weight: 95.3 kg (210 lb 1.6 oz)  Body mass index is 28.49 kg/m².    No intake or output data in the 24 hours ending 09/14/20 1642    Physical Exam  Neurological:      Mental Status: He is oriented to person, place, and time.   Psychiatric:         Attention and Perception: He is inattentive.         Mood and Affect: Mood is anxious and depressed. Affect is labile.         Speech: Speech is rapid and  pressured.         Behavior: Behavior is agitated.         Thought Content: Thought content is paranoid (decreased).         Cognition and Memory: Cognition and memory normal.         Judgment: Judgment is impulsive.       NEUROLOGICAL EXAMINATION:     MENTAL STATUS   Oriented to person, place, and time.   Registration: recalls 3 of 3 objects. Recall at 5 minutes: recalls 2 of 3 objects. Follows 3 step commands.   Attention: decreased. Concentration: decreased.   Level of consciousness: arousable by verbal stimuli ,  alert  Knowledge: poor and consistent with education. Able to perform simple calculations.   Able to name object. Able to read. Able to repeat. Able to write. Normal comprehension. Praxis: normal     Significant Labs:   Last 24 Hours:   Recent Lab Results     None          Significant Imaging: I have reviewed all pertinent imaging results/findings within the past 24 hours.
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
## 2020-09-14 NOTE — PLAN OF CARE
Patient denies all ideations and remains compliant with medication administration.  No abnormalities noted.  Patient expressed that he is ready and needs to get back to work.  Mood and affect in normal range.  No concerns.

## 2020-09-15 VITALS
DIASTOLIC BLOOD PRESSURE: 75 MMHG | HEART RATE: 113 BPM | WEIGHT: 210.13 LBS | BODY MASS INDEX: 28.46 KG/M2 | HEIGHT: 72 IN | OXYGEN SATURATION: 100 % | SYSTOLIC BLOOD PRESSURE: 130 MMHG | RESPIRATION RATE: 18 BRPM | TEMPERATURE: 98 F

## 2020-09-15 PROCEDURE — 25000003 PHARM REV CODE 250: Performed by: NURSE PRACTITIONER

## 2020-09-15 RX ORDER — BUPROPION HYDROCHLORIDE 150 MG/1
150 TABLET ORAL DAILY
Qty: 30 TABLET | Refills: 0 | Status: ON HOLD | OUTPATIENT
Start: 2020-09-16 | End: 2020-10-19 | Stop reason: HOSPADM

## 2020-09-15 RX ORDER — RISPERIDONE 1 MG/1
1 TABLET ORAL NIGHTLY
Qty: 30 TABLET | Refills: 0 | Status: ON HOLD | OUTPATIENT
Start: 2020-09-15 | End: 2020-10-19 | Stop reason: HOSPADM

## 2020-09-15 RX ORDER — MIRTAZAPINE 15 MG/1
15 TABLET, FILM COATED ORAL NIGHTLY
Qty: 30 TABLET | Refills: 0 | Status: ON HOLD | OUTPATIENT
Start: 2020-09-15 | End: 2020-10-19 | Stop reason: HOSPADM

## 2020-09-15 RX ADMIN — BUPROPION HYDROCHLORIDE 150 MG: 150 TABLET, FILM COATED, EXTENDED RELEASE ORAL at 08:09

## 2020-09-15 NOTE — PSYCH
Community Group  Short Term Goals: Discharge  Patient Response: Implement discharge plan. Patient states he feels ready to go home an understand the importance of taking his medicine even when he's feeling good.

## 2020-09-15 NOTE — PROGRESS NOTES
Patient is awaiting possible d/c ,denies h/s ideations,no a/v hallucinations,aftercare scheduled with [facility],scheduled for Friday 9-,meds called by  to [facility] pharmacy on [address] .

## 2020-09-15 NOTE — PLAN OF CARE
Problem: Adult Behavioral Health Plan of Care  Goal: Optimized Coping Skills in Response to Life Stressors  Outcome: Ongoing, Progressing  Goal: Develops/Participates in Therapeutic [place] to Support Successful Transition  Outcome: Ongoing, Progressing     B- Alert but labile    I- Grief and Loss    R- non-verbal    P- Encourage group attendance, participation and processing
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
## 2020-09-15 NOTE — NURSING
Patient d/c ed to home in stable condition,denies h/s ideations,discussed meds and aftercare ,all personal belongings returned all necessary papers signed,aftercare with [facility] scheduled for Friday,09-18-20.acknowledged understanding to all d/c instructions,escorted per staff,transp.provided by stp,

## 2020-09-15 NOTE — PLAN OF CARE
Problem: Adult Behavioral Health Plan of Care  Goal: Optimized Coping Skills in Response to Life Stressors  9/15/2020 1214 by Bibi Ya LCSW  Outcome: Ongoing, Progressing  9/15/2020 1211 by Bibi Ya LCSW  Outcome: Ongoing, Progressing    B- Active participation but labile    I- Grief and Loss    R- Pt  Was teary-eyed but non-verbal    P- Continue to encourage group attendance and processing of content.

## 2020-09-15 NOTE — NURSING
"Pt cooperative. Mood pleasant. Engages well with peers. Denies SI/HI or A/V/H. Appetite good, snack consumed. Pt took a shower. He's hoping to be discharged tomorrow. "This was a hard lesson for me, I said  the wrong things and that's why I'm here. I will use my coping skills in the future." Pt participated in the group f/u session with the [facility]. Compliant with medications. No prn medications requested at this time. Close observation continued. NAD.    "

## 2020-09-15 NOTE — DISCHARGE INSTRUCTIONS
Take meds as prescribed,aftercare with [facility] in [facility]meds called to [facility] pharmacy also in [place] on [facility].aftercare appt.scheduled for Friday 09-18-20.

## 2020-09-15 NOTE — SUBJECTIVE & OBJECTIVE
Family History     None        Tobacco Use    Smoking status: Current Every Day Smoker     Packs/day: 1.00     Types: Cigarettes    Smokeless tobacco: Never Used   Substance and Sexual Activity    Alcohol use: No    Drug use: Yes     Frequency: 1.0 times per week     Types: Marijuana, Cocaine    Sexual activity: Not on file     Psychotherapeutics (From admission, onward)    Start     Stop Route Frequency Ordered    09/12/20 2100  risperiDONE tablet 1 mg     Question:  Is the patient competent?  Answer:  Yes    -- Oral Nightly 09/12/20 1521    09/11/20 2207  ziprasidone capsule 20 mg      -- Oral Every 4 hours PRN 09/11/20 2108 09/11/20 2207  mirtazapine tablet 15 mg      -- Oral Nightly PRN 09/11/20 2108 09/11/20 2100  mirtazapine tablet 15 mg      -- Oral Nightly 09/11/20 1132 09/11/20 1145  buPROPion TB24 tablet 150 mg      -- Oral Daily 09/11/20 1132           Review of Systems   Psychiatric/Behavioral: Negative.      Objective:     Vital Signs (Most Recent):  Temp: 97.9 °F (36.6 °C) (09/15/20 0900)  Pulse: (!) 113 (09/15/20 0900)  Resp: 18 (09/15/20 0900)  BP: 130/75 (09/15/20 0900)  SpO2: 100 % (09/15/20 0900) Vital Signs (24h Range):  Temp:  [97.9 °F (36.6 °C)-98.3 °F (36.8 °C)] 97.9 °F (36.6 °C)  Pulse:  [102-113] 113  Resp:  [16-18] 18  SpO2:  [97 %-100 %] 100 %  BP: (120-130)/(71-75) 130/75     Height: 6' (182.9 cm)  Weight: 95.3 kg (210 lb 1.6 oz)  Body mass index is 28.49 kg/m².    No intake or output data in the 24 hours ending 09/15/20 1336    Physical Exam  Psychiatric:         Attention and Perception: Attention and perception normal.         Mood and Affect: Mood is anxious.         Speech: Speech normal.         Behavior: Behavior normal. Behavior is cooperative.         Thought Content: Thought content normal. Thought content does not include homicidal or suicidal ideation.         Cognition and Memory: Cognition and memory normal.      Comments: Fair insight and judgment        Significant Labs:   Last 24 Hours:   Recent Lab Results     None          Significant Imaging: I have reviewed all pertinent imaging results/findings within the past 24 hours.

## 2020-09-15 NOTE — PLAN OF CARE
Problem: Adult Inpatient Plan of Care  Goal: Plan of Care Review  Outcome: Ongoing, Progressing  Goal: Patient-Specific Goal (Individualization)  Outcome: Ongoing, Progressing  Goal: Absence of Hospital-Acquired Illness or Injury  Outcome: Ongoing, Progressing  Goal: Optimal Comfort and Wellbeing  Outcome: Ongoing, Progressing  Goal: Readiness for Transition of Care  Outcome: Ongoing, Progressing  Goal: Rounds/Family Conference  Outcome: Ongoing, Progressing     Problem: Wound  Goal: Optimal Wound Healing  Outcome: Ongoing, Progressing     Problem: Adult Behavioral Health Plan of Care  Goal: Plan of Care Review  Outcome: Ongoing, Progressing  Goal: Patient-Specific Goal (Individualization)  Outcome: Ongoing, Progressing  Goal: Adheres to Safety Considerations for Self and Others  Outcome: Ongoing, Progressing  Goal: Optimized Coping Skills in Response to Life Stressors  Outcome: Ongoing, Progressing  Goal: Develops/Participates in Therapeutic [place] to Support Successful Transition  Outcome: Ongoing, Progressing  Goal: Rounds/Family Conference  Outcome: Ongoing, Progressing

## 2020-10-09 ENCOUNTER — HOSPITAL ENCOUNTER (EMERGENCY)
Facility: HOSPITAL | Age: 30
Discharge: PSYCHIATRIC OR SUBSTANCE USE FACILITY | End: 2020-10-09
Attending: EMERGENCY MEDICINE
Payer: MEDICAID

## 2020-10-09 VITALS
RESPIRATION RATE: 15 BRPM | OXYGEN SATURATION: 99 % | TEMPERATURE: 99 F | SYSTOLIC BLOOD PRESSURE: 107 MMHG | DIASTOLIC BLOOD PRESSURE: 71 MMHG | HEART RATE: 102 BPM

## 2020-10-09 DIAGNOSIS — R45.850 HOMICIDAL IDEATION: ICD-10-CM

## 2020-10-09 DIAGNOSIS — F19.950: Primary | ICD-10-CM

## 2020-10-09 PROBLEM — R46.2 BIZARRE BEHAVIOR: Status: ACTIVE | Noted: 2020-10-09

## 2020-10-09 LAB
ALBUMIN SERPL BCP-MCNC: 4.6 G/DL (ref 3.5–5.2)
ALP SERPL-CCNC: 56 U/L (ref 55–135)
ALT SERPL W/O P-5'-P-CCNC: 21 U/L (ref 10–44)
AMPHET+METHAMPHET UR QL: ABNORMAL
ANION GAP SERPL CALC-SCNC: 8 MMOL/L (ref 8–16)
APAP SERPL-MCNC: <3 UG/ML (ref 10–20)
AST SERPL-CCNC: 22 U/L (ref 10–40)
BACTERIA #/AREA URNS HPF: NORMAL /HPF
BARBITURATES UR QL SCN>200 NG/ML: NEGATIVE
BASOPHILS # BLD AUTO: 0.04 K/UL (ref 0–0.2)
BASOPHILS NFR BLD: 0.6 % (ref 0–1.9)
BENZODIAZ UR QL SCN>200 NG/ML: NEGATIVE
BILIRUB SERPL-MCNC: 0.9 MG/DL (ref 0.1–1)
BILIRUB UR QL STRIP: ABNORMAL
BUN SERPL-MCNC: 11 MG/DL (ref 6–20)
BZE UR QL SCN: NEGATIVE
CALCIUM SERPL-MCNC: 8.9 MG/DL (ref 8.7–10.5)
CANNABINOIDS UR QL SCN: ABNORMAL
CHLORIDE SERPL-SCNC: 107 MMOL/L (ref 95–110)
CLARITY UR: CLEAR
CO2 SERPL-SCNC: 24 MMOL/L (ref 23–29)
COLOR UR: YELLOW
CREAT SERPL-MCNC: 1.1 MG/DL (ref 0.5–1.4)
CREAT UR-MCNC: 438.4 MG/DL (ref 23–375)
DIFFERENTIAL METHOD: ABNORMAL
EOSINOPHIL # BLD AUTO: 0.1 K/UL (ref 0–0.5)
EOSINOPHIL NFR BLD: 1.2 % (ref 0–8)
ERYTHROCYTE [DISTWIDTH] IN BLOOD BY AUTOMATED COUNT: 12.2 % (ref 11.5–14.5)
EST. GFR  (AFRICAN AMERICAN): >60 ML/MIN/1.73 M^2
EST. GFR  (NON AFRICAN AMERICAN): >60 ML/MIN/1.73 M^2
ETHANOL SERPL-MCNC: <10 MG/DL
GLUCOSE SERPL-MCNC: 107 MG/DL (ref 70–110)
GLUCOSE UR QL STRIP: NEGATIVE
HCT VFR BLD AUTO: 40.9 % (ref 40–54)
HGB BLD-MCNC: 14 G/DL (ref 14–18)
HGB UR QL STRIP: NEGATIVE
HYALINE CASTS #/AREA URNS LPF: 0 /LPF
IMM GRANULOCYTES # BLD AUTO: 0.01 K/UL (ref 0–0.04)
IMM GRANULOCYTES NFR BLD AUTO: 0.1 % (ref 0–0.5)
KETONES UR QL STRIP: ABNORMAL
LEUKOCYTE ESTERASE UR QL STRIP: NEGATIVE
LYMPHOCYTES # BLD AUTO: 1.2 K/UL (ref 1–4.8)
LYMPHOCYTES NFR BLD: 16.1 % (ref 18–48)
MCH RBC QN AUTO: 31.3 PG (ref 27–31)
MCHC RBC AUTO-ENTMCNC: 34.2 G/DL (ref 32–36)
MCV RBC AUTO: 92 FL (ref 82–98)
METHADONE UR QL SCN>300 NG/ML: NEGATIVE
MICROSCOPIC COMMENT: NORMAL
MONOCYTES # BLD AUTO: 0.7 K/UL (ref 0.3–1)
MONOCYTES NFR BLD: 9.2 % (ref 4–15)
NEUTROPHILS # BLD AUTO: 5.3 K/UL (ref 1.8–7.7)
NEUTROPHILS NFR BLD: 72.8 % (ref 38–73)
NITRITE UR QL STRIP: NEGATIVE
NRBC BLD-RTO: 0 /100 WBC
OPIATES UR QL SCN: NEGATIVE
PCP UR QL SCN>25 NG/ML: NEGATIVE
PH UR STRIP: 6 [PH] (ref 5–8)
PLATELET # BLD AUTO: 253 K/UL (ref 150–350)
PMV BLD AUTO: 9 FL (ref 9.2–12.9)
POTASSIUM SERPL-SCNC: 3.7 MMOL/L (ref 3.5–5.1)
PROT SERPL-MCNC: 7.3 G/DL (ref 6–8.4)
PROT UR QL STRIP: ABNORMAL
RBC # BLD AUTO: 4.47 M/UL (ref 4.6–6.2)
RBC #/AREA URNS HPF: 0 /HPF (ref 0–4)
SARS-COV-2 RDRP RESP QL NAA+PROBE: NEGATIVE
SODIUM SERPL-SCNC: 139 MMOL/L (ref 136–145)
SP GR UR STRIP: >=1.03 (ref 1–1.03)
SQUAMOUS #/AREA URNS HPF: 6 /HPF
TOXICOLOGY INFORMATION: ABNORMAL
TSH SERPL DL<=0.005 MIU/L-ACNC: 0.8 UIU/ML (ref 0.4–4)
URN SPEC COLLECT METH UR: ABNORMAL
UROBILINOGEN UR STRIP-ACNC: NEGATIVE EU/DL
WBC # BLD AUTO: 7.25 K/UL (ref 3.9–12.7)
WBC #/AREA URNS HPF: 0 /HPF (ref 0–5)

## 2020-10-09 PROCEDURE — 80329 ANALGESICS NON-OPIOID 1 OR 2: CPT

## 2020-10-09 PROCEDURE — 81000 URINALYSIS NONAUTO W/SCOPE: CPT | Mod: 59

## 2020-10-09 PROCEDURE — 80320 DRUG SCREEN QUANTALCOHOLS: CPT

## 2020-10-09 PROCEDURE — 63600175 PHARM REV CODE 636 W HCPCS: Performed by: EMERGENCY MEDICINE

## 2020-10-09 PROCEDURE — 96372 THER/PROPH/DIAG INJ SC/IM: CPT

## 2020-10-09 PROCEDURE — 84443 ASSAY THYROID STIM HORMONE: CPT

## 2020-10-09 PROCEDURE — 99284 EMERGENCY DEPT VISIT MOD MDM: CPT | Mod: 25

## 2020-10-09 PROCEDURE — 99212 OFFICE O/P EST SF 10 MIN: CPT | Mod: 95,AF,HB, | Performed by: PSYCHIATRY & NEUROLOGY

## 2020-10-09 PROCEDURE — 80307 DRUG TEST PRSMV CHEM ANLYZR: CPT

## 2020-10-09 PROCEDURE — 99212 PR OFFICE/OUTPT VISIT, EST, LEVL II, 10-19 MIN: ICD-10-PCS | Mod: 95,AF,HB, | Performed by: PSYCHIATRY & NEUROLOGY

## 2020-10-09 PROCEDURE — 85025 COMPLETE CBC W/AUTO DIFF WBC: CPT

## 2020-10-09 PROCEDURE — U0002 COVID-19 LAB TEST NON-CDC: HCPCS

## 2020-10-09 PROCEDURE — 80053 COMPREHEN METABOLIC PANEL: CPT

## 2020-10-09 RX ORDER — ZIPRASIDONE MESYLATE 20 MG/ML
20 INJECTION, POWDER, LYOPHILIZED, FOR SOLUTION INTRAMUSCULAR
Status: COMPLETED | OUTPATIENT
Start: 2020-10-09 | End: 2020-10-09

## 2020-10-09 RX ADMIN — ZIPRASIDONE MESYLATE 20 MG: 20 INJECTION, POWDER, LYOPHILIZED, FOR SOLUTION INTRAMUSCULAR at 09:10

## 2020-10-09 RX ADMIN — LORAZEPAM 2 MG: 2 INJECTION INTRAMUSCULAR; INTRAVENOUS at 09:10

## 2020-10-09 NOTE — ED NOTES
Pt resting quietly in room. Risk sitter at BS for one/one observation. Will continue to monitor closely.

## 2020-10-09 NOTE — ED PROVIDER NOTES
"Encounter Date: 10/9/2020    SCRIBE #1 NOTE: I, Mayda Quintero, am scribing for, and in the presence of,  Guy J. Lefort, MD. I have scribed the entire note.       History     Chief Complaint   Patient presents with    bizarre behavior     [place] police were dispatched to pt. residence three times last night. pt. was transported this morning for agitated, bizarre behavior. pt. arrives via ems with [place]. pt. is in [place] on arrival. security called prior to release       Time seen by provider: 8:24 AM on 10/09/2020    The patient is a 30 y.o. male who presents to the ED with complaint of paranoid delusions. Per EMS, [place] police was dispatched to patient's residence three times last night and was brought to ED due to bizarre behavior and agitation. He states he thinks his family is trying to "get rid of him because they see him as a burden". He admits to substance abuse within the last two days. Patient denies any SI, HI or hallucinations.     has a past medical history of Asthma and Drug abuse.   has a past surgical history that includes Anterior cruciate ligament repair (Right).    The history is provided by the patient and the EMS personnel. No  was used.     Review of patient's allergies indicates:  No Known Allergies  Past Medical History:   Diagnosis Date    Asthma     Drug abuse      Past Surgical History:   Procedure Laterality Date    ANTERIOR CRUCIATE LIGAMENT REPAIR Right      No family history on file.  Social History     Tobacco Use    Smoking status: Current Every Day Smoker     Packs/day: 1.00     Types: Cigarettes    Smokeless tobacco: Never Used   Substance Use Topics    Alcohol use: No    Drug use: Yes     Frequency: 1.0 times per week     Types: Marijuana, Cocaine     Review of Systems   Constitutional: Negative for chills, fatigue and fever.   HENT: Negative for facial swelling, trouble swallowing and voice change.    Respiratory: Negative for cough, choking and " shortness of breath.    Cardiovascular: Negative for chest pain, palpitations and leg swelling.   Gastrointestinal: Negative for abdominal pain, diarrhea, nausea and vomiting.   Genitourinary: Negative for dysuria, frequency and urgency.   Musculoskeletal: Negative for back pain, neck pain and neck stiffness.   Neurological: Negative for seizures, speech difficulty, light-headedness, numbness and headaches.   Psychiatric/Behavioral: Positive for agitation and sleep disturbance. Negative for hallucinations and suicidal ideas.        + paranoid delusions.   All other systems reviewed and are negative.      Physical Exam     Initial Vitals [10/09/20 0823]   BP Pulse Resp Temp SpO2   137/83 110 20 98.5 °F (36.9 °C) 97 %      MAP       --         Physical Exam    Nursing note and vitals reviewed.  Constitutional: He appears well-developed and well-nourished. No distress.   HENT:   Head: Normocephalic and atraumatic.   Mouth/Throat: Oropharynx is clear and moist.   Eyes: Conjunctivae and EOM are normal. Pupils are equal, round, and reactive to light.   Neck: Normal range of motion. Neck supple.   Cardiovascular: Normal rate, regular rhythm, normal heart sounds and intact distal pulses.   Pulmonary/Chest: Breath sounds normal. No respiratory distress. He has no wheezes. He has no rhonchi. He has no rales.   Abdominal: Soft. Bowel sounds are normal. He exhibits no distension. There is no abdominal tenderness.   Musculoskeletal: Normal range of motion. No tenderness or edema.   Neurological: He is alert and oriented to person, place, and time. He has normal strength. No cranial nerve deficit.   Skin: Skin is warm and dry. Capillary refill takes less than 2 seconds.   Psychiatric: Thought content is paranoid and delusional. He expresses no homicidal and no suicidal ideation. He expresses no suicidal plans.         ED Course   Procedures  Labs Reviewed   CBC W/ AUTO DIFFERENTIAL - Abnormal; Notable for the following  components:       Result Value    RBC 4.47 (*)     Mean Corpuscular Hemoglobin 31.3 (*)     MPV 9.0 (*)     Lymph% 16.1 (*)     All other components within normal limits   ACETAMINOPHEN LEVEL - Abnormal; Notable for the following components:    Acetaminophen (Tylenol), Serum <3.0 (*)     All other components within normal limits   COMPREHENSIVE METABOLIC PANEL   TSH   ALCOHOL,MEDICAL (ETHANOL)   SARS-COV-2 RNA AMPLIFICATION, QUAL   URINALYSIS, REFLEX TO URINE CULTURE   DRUG SCREEN PANEL, URINE EMERGENCY          Imaging Results    None          Medical Decision Making:   Initial Assessment:   Past medical records queried and reviewed, including Asthma and Drug Abuse.    30 year old male presents to the ED complaining of paranoid delusions. The patient was seen and examined. The history and physical exam was obtained. The nursing notes and vital signs were reviewed.     Secondary to symptoms and exam findings we ordered:    Labs: CBC auto differential, CP, TSH, urinalysis, drug screen panel, Ethanol, Acetaminophen level, COVID-19 screening.    Patient will be administered Ziprasidone injection and Lorazepam.  Patient will be monitored here.  Clinical Tests:   Lab Tests: Ordered and Reviewed  ED Management:  Later in visit, crisis center reported HI, altercations with family PTA.    PEC complete  Medically cleared for psychiatric evaluation                             Clinical Impression:     ICD-10-CM ICD-9-CM   1. Drug induced delusional state  F19.950 292.11   2. Homicidal ideation  R45.850 V62.85                      Disposition:   Disposition: Transferred  Condition: Stable     ED Disposition Condition    Transfer to Psych Facility         ED Prescriptions     None        Follow-up Information    None                      I, Dr. Guy Lefort, personally performed the services described in this documentation. All medical record entries made by the scribe were at my direction and in my presence. I have reviewed the  chart and agree that the record reflects my personal performance and is accurate and complete. Guy Lefort, MD.  2:27 PM 10/09/2020                   Guy J. Lefort, MD  10/09/20 [id number]

## 2020-10-09 NOTE — ED NOTES
Physician at bedside. On arrival pt. Is hyper verbal, agitated and rambling. Pt. Is paranoid. He thinks his brother and other family members are doing things to him. Police were at his residence three times last night. Brother reported pt. Was sleeping on the couch next to a bag of garbage that he had brought into the house that didn't belong to them. Security at bedside to wand patient.

## 2020-10-09 NOTE — CONSULTS
"Tele-Consultation to Emergency Department from Psychiatry    Please see previous notes:    From current presentation:  "The patient is a 30 y.o. male who presents to the ED with complaint of paranoid delusions. Per EMS, [place] police was dispatched to patient's residence three times last night and was brought to ED due to bizarre behavior and agitation. He states he thinks his family is trying to "get rid of him because they see him as a burden". He admits to substance abuse within the last two days. Patient denies any SI, HI or hallucinations."    Patient agreeable to consultation via telepsychiatry.    Start time of consultation: 2:50 pm    The chief complaint leading to psychiatric consultation is: psychosis  This consultation is from the Emergency Department attending physician Dr. Guy Lefort.   The location of the consulting psychiatrist is [address] [place] [address].  The patient location is [facility].     Patient Identification:  Nagi Castaneda is a 30 y.o. male.    Patient information was obtained from patient.    History of Present Illness:    "I'm a hundred percent innocent, my family has something to do with it, my neighbor has something to do with it", "my whole house is bugged". Denies AH's.  Uses Adderall, sometimes Methamphetamine, Marijuana at night, occasional pain pills. Denies alcohol.  No recent medication.  Pt. Drowsy, falling asleep during interview[received IM Geodon 20 mg and Ativan 2 mg this morning in ER].    MotherTorie, [phone], [phone]: no answer    Psychiatric History:   For history please see psychiatry consult from 09/10/20.    Review of Systems:  Denies any current physical complaint    Past Medical History:   Past Medical History:   Diagnosis Date    Asthma     Drug abuse       Allergies: nkda  Review of patient's allergies indicates:  No Known Allergies    Medications in ER:   Medications   ziprasidone injection 20 mg (20 mg Intramuscular Given 10/9/20 " 0907)   lorazepam (ATIVAN) injection 2 mg (2 mg Intramuscular Given 10/9/20 0904)     Social History:   Housing Status: has been living with mother and brother    Current Evaluation:     Constitutional  Vitals:  Vitals:    10/09/20 0823 10/09/20 1435   BP: 137/83 119/70   Pulse: 110 70   Resp: 20 16   Temp: 98.5 °F (36.9 °C) 98.6 °F (37 °C)   TempSrc: Oral Oral   SpO2: 97% 99%      General:  unremarkable, age appropriate     Musculoskeletal  Muscle Strength/Tone:   moving arms normally   Gait & Station:   sitting on stretcher     Psychiatric  Level of Consciousness: drowsy  Grooming: in hospital gown  Psychomotor Behavior: no agitation  Speech: normal in rate, rhythm and volume  Affect: constricted  Thought Process: goal directed  Associations: intact  Thought Content: denies SI/HI  Insight: appears limited  Judgement: appears limited    Relevant Elements of Neurological Exam: no abnormality of posture noted    Assessment - Diagnosis - Goals:     Diagnosis/Impression:   Psychosis, 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 Lefort.    Rec:   - medical clearance  - PEC and psychiatric hospitalization  - no standing psychotropic medication for now  - Haldol/Benadryl/Ativan p.o./i.m. prn for agitation  - follow EKG if pt. Receives Haldol    Time with patient: 10 min    Laboratory Data:   Labs Reviewed   CBC W/ AUTO DIFFERENTIAL - Abnormal; Notable for the following components:       Result Value    RBC 4.47 (*)     Mean Corpuscular Hemoglobin 31.3 (*)     MPV 9.0 (*)     Lymph% 16.1 (*)     All other components within normal limits   URINALYSIS, REFLEX TO URINE CULTURE - Abnormal; Notable for the following components:    Specific Gravity, UA >=1.030 (*)     Protein, UA 2+ (*)     Ketones, UA Trace (*)     Bilirubin (UA) 1+ (*)     All other components within normal limits    Narrative:     Specimen Source->Urine   DRUG  SCREEN PANEL, URINE EMERGENCY - Abnormal; Notable for the following components:    Creatinine, Random Ur 438.4 (*)     All other components within normal limits    Narrative:     Specimen Source->Urine   ACETAMINOPHEN LEVEL - Abnormal; Notable for the following components:    Acetaminophen (Tylenol), Serum <3.0 (*)     All other components within normal limits   COMPREHENSIVE METABOLIC PANEL   TSH   ALCOHOL,MEDICAL (ETHANOL)   SARS-COV-2 RNA AMPLIFICATION, QUAL   URINALYSIS MICROSCOPIC    Narrative:     Specimen Source->Urine

## 2020-10-09 NOTE — ED NOTES
Patient sleeping but easily arousable. VSS. Sitter at bedside. Room remains free of harmful objects and patient remains in paper scrubs.

## 2020-10-10 PROBLEM — Z13.9 ENCOUNTER FOR MEDICAL SCREENING EXAMINATION: Status: ACTIVE | Noted: 2020-10-10

## 2021-01-11 PROBLEM — Z13.9 ENCOUNTER FOR MEDICAL SCREENING EXAMINATION: Status: RESOLVED | Noted: 2020-10-10 | Resolved: 2021-01-11

## 2021-06-09 NOTE — ED PROVIDER NOTES
"Encounter Date: 3/8/2020       History     Chief Complaint   Patient presents with    Knee Pain     patient was playing basketball and fell, injuring his left knee.  He said he heard several pops when he fell; unable to bear weight at this time     The patient presents to the ER c/o acute left knee pain. He states that he fell awkwardly while playing basketball this morning, and felt a pop and immediate pain. He states that his knee cap looked "shifted over". He was unable to walk or take steps due to the pain. He states that the degree of pain is moderate. He states that the pain course is constant. He states that any movement of the knee exacerbates the pain. He denies any additional symptoms, injuries, or concerns. He denies any pre-arrival treatment. He reports history of right ACL repair in the past, but denies any history of previous left knee injury.         Review of patient's allergies indicates:  No Known Allergies  Past Medical History:   Diagnosis Date    Asthma     Drug abuse      Past Surgical History:   Procedure Laterality Date    ANTERIOR CRUCIATE LIGAMENT REPAIR Right      No family history on file.  Social History     Tobacco Use    Smoking status: Current Every Day Smoker     Packs/day: 1.00     Types: Cigarettes    Smokeless tobacco: Never Used   Substance Use Topics    Alcohol use: No    Drug use: Yes     Frequency: 1.0 times per week     Types: Marijuana, Cocaine     Review of Systems   Constitutional: Negative for chills and fever.   Respiratory: Negative for shortness of breath.    Cardiovascular: Negative for chest pain and leg swelling.   Gastrointestinal: Negative for abdominal pain, nausea and vomiting.   Musculoskeletal: Positive for arthralgias, gait problem and joint swelling. Negative for back pain and neck pain.   Skin: Negative for color change and wound.   Allergic/Immunologic: Negative for immunocompromised state.   Neurological: Negative for syncope, weakness, " light-headedness, numbness and headaches.   Psychiatric/Behavioral: Negative for confusion.       Physical Exam     Initial Vitals [03/08/20 1153]   BP Pulse Resp Temp SpO2   116/83 (!) 114 20 -- 98 %      MAP       --         Physical Exam    Nursing note and vitals reviewed.  Constitutional: He appears well-developed and well-nourished. He is not diaphoretic. He appears distressed.   HENT:   Head: Normocephalic and atraumatic.   Mouth/Throat: Oropharynx is clear and moist.   Eyes: Conjunctivae are normal.   Neck: Normal range of motion.   Cardiovascular: Normal rate and intact distal pulses.   Pulmonary/Chest: Breath sounds normal. No respiratory distress.   Abdominal: Soft. He exhibits no distension. There is no tenderness.   Musculoskeletal:   Left knee: No obvious effusion. No erythema or ecchymosis. No deformity. FROM observed. Pain with passive ROM. Pain to palpation of lateral aspect of knee. Negative anterior/posterior drawer. Negative varus/valgus stress. NV intact.    Neurological: He is alert and oriented to person, place, and time. He has normal strength. No sensory deficit. GCS score is 15. GCS eye subscore is 4. GCS verbal subscore is 5. GCS motor subscore is 6.   Skin: Skin is warm and dry. No rash noted. No erythema.   Psychiatric: He has a normal mood and affect. His behavior is normal.         ED Course   Orthopedic Injury  Date/Time: 3/8/2020 1:27 PM  Performed by: Dionisio Sandoval PA-C  Authorized by: Shereen Jacinto MD     Consent Done?:  Yes  Universal Protocol:     Verbal consent obtained?: Yes      Risks and benefits: Risks, benefits and alternatives were discussed      Consent given by:  Patient    Patient states understanding of procedure being performed: Yes    Injury:     Injury location:  Knee    Location details:  Left knee    Injury type:  Soft tissue      Pre-procedure assessment:     Neurovascular status: Neurovascularly intact      Distal perfusion: normal       "Neurological function: normal      Range of motion: normal        Selections made in this section will also lock the Injury type section above.:     Immobilization:  Splint and crutches    Splint type: Knee immobilizer     Complications: No    Post-procedure assessment:     Neurovascular status: Neurovascularly intact      Distal perfusion: normal      Neurological function: normal      Range of motion: splinted      Patient tolerance:  Patient tolerated the procedure well with no immediate complications      Labs Reviewed - No data to display       Imaging Results          X-Ray Knee 3 View Left (Final result)  Result time 03/08/20 12:55:12    Final result by Triston Palumbo MD (03/08/20 12:55:12)                 Impression:      1. No acute displaced fracture or dislocation of the knee.      Electronically signed by: Triston Palumbo MD  Date:    03/08/2020  Time:    12:55             Narrative:    EXAMINATION:  XR KNEE 3 VIEW LEFT    CLINICAL HISTORY:  Unspecified fall, initial encounter    TECHNIQUE:  AP, lateral, and Merchant views of the left knee were performed.    COMPARISON:  None    FINDINGS:  Three views left knee.    No acute displaced fracture or dislocation of the knee.  No radiopaque foreign body.  No large knee joint effusion.                                 Medical Decision Making:   Initial Assessment:   Here due to acute left knee pain after awkward landing during basketball this am. Reports patella "shifted" laterally.   Differential Diagnosis:   Patella dislocation, fracture, sprain, strain, effusion, meniscus injury, ligament injury, tendonitis, Gout, septic joint, etc   Clinical Tests:   Radiological Study: Ordered and Reviewed  ED Management:  I discussed the case with the ER attending physician   X ray unremarkable   Possible patella dislocation PTA based on pt description - although not dislocated at time of my exam - no significant swelling or effusion - FROM noted   Toradol PO given in " ED   Knee immobilizer and crutches ordered - patient initially refused, then agreed  Pt given Rx for NSAID to take prn   Pt advised to see PCP and/or orthopedics in the next 2-3 days for re-check   Pt advised to return to the ER promptly if worse.       Additional MDM:   Smoking Cessation: The patient is a smoker. The patient was counseled on smoking cessation for: 3 minutes. The patient was counseled on tobacco related  health complications. Appropriate patient literature was given to the patient concerning tobacco cessation.   X-Rays: I have independently interpreted X-Ray(s) - see notes.                               Clinical Impression:       ICD-10-CM ICD-9-CM   1. Knee injury, left, initial encounter S89.92XA 959.7   2. Fall W19.XXXA E888.9   3. Injury while playing basketball Y93.67 E007.6   4. Tobacco use Z72.0 305.1         Disposition:   Disposition: Discharged  Condition: Stable     ED Disposition Condition    Discharge Stable        ED Prescriptions     Medication Sig Dispense Start Date End Date Auth. Provider    naproxen (NAPROSYN) 500 MG tablet Take 1 tablet (500 mg total) by mouth every 12 (twelve) hours as needed (pain). 14 tablet 3/8/2020 3/15/2020 Dionisio Sandoval PA-C        Follow-up Information     Follow up With Specialties Details Why Contact Info Additional Information    [facility] - Jaron Husain  Schedule an appointment as soon as possible for a visit in 2 days  [address] [employer] [facility] [id number] [phone]       [facility] - Orthopedics Orthopedics Schedule an appointment as soon as possible for a visit  As needed for re-evaluation and further management of left knee injury. [address]  [facility] [address] [phone] [address]    [facility]  If symptoms worsen or if unimproved. [address]  [facility] [address] [phone]                                       Dionisio Sandoval PA-C  03/08/20 6239     Calcipotriene Pregnancy And Lactation Text: This medication has not been proven safe during pregnancy. It is unknown if this medication is excreted in breast milk.